# Patient Record
Sex: FEMALE | Race: WHITE | NOT HISPANIC OR LATINO | Employment: FULL TIME | URBAN - METROPOLITAN AREA
[De-identification: names, ages, dates, MRNs, and addresses within clinical notes are randomized per-mention and may not be internally consistent; named-entity substitution may affect disease eponyms.]

---

## 2023-06-07 PROBLEM — Z12.11 SCREENING FOR MALIGNANT NEOPLASM OF COLON: Status: ACTIVE | Noted: 2023-06-07

## 2023-06-07 PROBLEM — Z12.11 SCREENING FOR MALIGNANT NEOPLASM OF COLON: Status: RESOLVED | Noted: 2023-06-07 | Resolved: 2023-06-07

## 2023-06-07 PROBLEM — D49.89 NEOPLASM OF BACK: Status: ACTIVE | Noted: 2023-06-07

## 2023-06-07 PROBLEM — D22.9 MELANOCYTIC NEOPLASM OF SKIN: Status: ACTIVE | Noted: 2023-06-07

## 2023-08-30 PROBLEM — F32.A DEPRESSION: Status: ACTIVE | Noted: 2023-08-30

## 2023-08-30 PROBLEM — Z98.84 BARIATRIC SURGERY STATUS: Status: ACTIVE | Noted: 2023-08-30

## 2023-08-30 PROBLEM — E55.9 VITAMIN D DEFICIENCY: Status: ACTIVE | Noted: 2023-08-30

## 2023-08-30 PROBLEM — N93.9 ABNORMAL UTERINE BLEEDING (AUB): Status: ACTIVE | Noted: 2023-08-30

## 2023-08-30 PROBLEM — E66.01 PSYCHOLOGICAL FACTORS AFFECTING MORBID OBESITY (MULTI): Status: ACTIVE | Noted: 2023-08-30

## 2023-08-30 PROBLEM — F54 PSYCHOLOGICAL FACTORS AFFECTING MORBID OBESITY (MULTI): Status: ACTIVE | Noted: 2023-08-30

## 2023-08-30 PROBLEM — Z98.84 S/P LAPAROSCOPIC SLEEVE GASTRECTOMY: Status: ACTIVE | Noted: 2023-08-30

## 2023-08-30 PROBLEM — G47.33 OBSTRUCTIVE SLEEP APNEA SYNDROME IN ADULT: Status: ACTIVE | Noted: 2023-08-30

## 2023-08-30 PROBLEM — E78.5 HLD (HYPERLIPIDEMIA): Status: ACTIVE | Noted: 2023-08-30

## 2023-08-30 RX ORDER — CALCIUM CARBONATE 500(1250)
TABLET ORAL
COMMUNITY
Start: 2022-02-25

## 2023-08-30 RX ORDER — MULTIVIT WITH IRON,MINERALS
1 TABLET,CHEWABLE ORAL DAILY
COMMUNITY
End: 2024-05-06 | Stop reason: WASHOUT

## 2023-08-30 RX ORDER — BUPROPION HYDROCHLORIDE 200 MG/1
1 TABLET, EXTENDED RELEASE ORAL DAILY
COMMUNITY
Start: 2020-05-06 | End: 2024-03-01

## 2023-08-30 RX ORDER — LANOLIN ALCOHOL/MO/W.PET/CERES
CREAM (GRAM) TOPICAL
COMMUNITY
Start: 2022-02-25

## 2023-08-30 RX ORDER — UBIDECARENONE 30 MG
CAPSULE ORAL
COMMUNITY
Start: 2022-02-25

## 2023-08-30 RX ORDER — CHOLECALCIFEROL (VITAMIN D3) 125 MCG
1 CAPSULE ORAL DAILY
COMMUNITY

## 2023-09-27 LAB
ALANINE AMINOTRANSFERASE (SGPT) (U/L) IN SER/PLAS: 7 U/L (ref 7–45)
ALBUMIN (G/DL) IN SER/PLAS: 4.2 G/DL (ref 3.4–5)
ALKALINE PHOSPHATASE (U/L) IN SER/PLAS: 60 U/L (ref 33–110)
ANION GAP IN SER/PLAS: 13 MMOL/L (ref 10–20)
ASPARTATE AMINOTRANSFERASE (SGOT) (U/L) IN SER/PLAS: 14 U/L (ref 9–39)
BASOPHILS (10*3/UL) IN BLOOD BY AUTOMATED COUNT: 0.05 X10E9/L (ref 0–0.1)
BASOPHILS/100 LEUKOCYTES IN BLOOD BY AUTOMATED COUNT: 0.8 % (ref 0–2)
BILIRUBIN TOTAL (MG/DL) IN SER/PLAS: 0.6 MG/DL (ref 0–1.2)
CALCIDIOL (25 OH VITAMIN D3) (NG/ML) IN SER/PLAS: 38 NG/ML
CALCIUM (MG/DL) IN SER/PLAS: 9.2 MG/DL (ref 8.6–10.6)
CARBON DIOXIDE, TOTAL (MMOL/L) IN SER/PLAS: 25 MMOL/L (ref 21–32)
CHLORIDE (MMOL/L) IN SER/PLAS: 107 MMOL/L (ref 98–107)
COBALAMIN (VITAMIN B12) (PG/ML) IN SER/PLAS: 484 PG/ML (ref 211–911)
CREATININE (MG/DL) IN SER/PLAS: 0.87 MG/DL (ref 0.5–1.05)
EOSINOPHILS (10*3/UL) IN BLOOD BY AUTOMATED COUNT: 0.08 X10E9/L (ref 0–0.7)
EOSINOPHILS/100 LEUKOCYTES IN BLOOD BY AUTOMATED COUNT: 1.2 % (ref 0–6)
ERYTHROCYTE DISTRIBUTION WIDTH (RATIO) BY AUTOMATED COUNT: 12.9 % (ref 11.5–14.5)
ERYTHROCYTE MEAN CORPUSCULAR HEMOGLOBIN CONCENTRATION (G/DL) BY AUTOMATED: 33.5 G/DL (ref 32–36)
ERYTHROCYTE MEAN CORPUSCULAR VOLUME (FL) BY AUTOMATED COUNT: 90 FL (ref 80–100)
ERYTHROCYTES (10*6/UL) IN BLOOD BY AUTOMATED COUNT: 4.63 X10E12/L (ref 4–5.2)
FERRITIN (UG/LL) IN SER/PLAS: 34 UG/L (ref 8–150)
FOLATE (NG/ML) IN SER/PLAS: >24 NG/ML
GFR FEMALE: 89 ML/MIN/1.73M2
GLUCOSE (MG/DL) IN SER/PLAS: 60 MG/DL (ref 74–99)
HEMATOCRIT (%) IN BLOOD BY AUTOMATED COUNT: 41.8 % (ref 36–46)
HEMOGLOBIN (G/DL) IN BLOOD: 14 G/DL (ref 12–16)
IMMATURE GRANULOCYTES/100 LEUKOCYTES IN BLOOD BY AUTOMATED COUNT: 0.2 % (ref 0–0.9)
IRON (UG/DL) IN SER/PLAS: 133 UG/DL (ref 35–150)
IRON BINDING CAPACITY (UG/DL) IN SER/PLAS: 384 UG/DL (ref 240–445)
IRON SATURATION (%) IN SER/PLAS: 35 % (ref 25–45)
LEUKOCYTES (10*3/UL) IN BLOOD BY AUTOMATED COUNT: 6.6 X10E9/L (ref 4.4–11.3)
LYMPHOCYTES (10*3/UL) IN BLOOD BY AUTOMATED COUNT: 2.17 X10E9/L (ref 1.2–4.8)
LYMPHOCYTES/100 LEUKOCYTES IN BLOOD BY AUTOMATED COUNT: 32.9 % (ref 13–44)
MONOCYTES (10*3/UL) IN BLOOD BY AUTOMATED COUNT: 0.67 X10E9/L (ref 0.1–1)
MONOCYTES/100 LEUKOCYTES IN BLOOD BY AUTOMATED COUNT: 10.2 % (ref 2–10)
NEUTROPHILS (10*3/UL) IN BLOOD BY AUTOMATED COUNT: 3.61 X10E9/L (ref 1.2–7.7)
NEUTROPHILS/100 LEUKOCYTES IN BLOOD BY AUTOMATED COUNT: 54.7 % (ref 40–80)
NRBC (PER 100 WBCS) BY AUTOMATED COUNT: 0 /100 WBC (ref 0–0)
PARATHYRIN INTACT (PG/ML) IN SER/PLAS: 22.3 PG/ML (ref 18.5–88)
PLATELETS (10*3/UL) IN BLOOD AUTOMATED COUNT: 262 X10E9/L (ref 150–450)
POTASSIUM (MMOL/L) IN SER/PLAS: 4.4 MMOL/L (ref 3.5–5.3)
PROTEIN TOTAL: 6.5 G/DL (ref 6.4–8.2)
SODIUM (MMOL/L) IN SER/PLAS: 141 MMOL/L (ref 136–145)
THYROTROPIN (MIU/L) IN SER/PLAS BY DETECTION LIMIT <= 0.05 MIU/L: 1.15 MIU/L (ref 0.44–3.98)
UREA NITROGEN (MG/DL) IN SER/PLAS: 12 MG/DL (ref 6–23)

## 2023-09-29 ENCOUNTER — HOSPITAL ENCOUNTER (OUTPATIENT)
Dept: DATA CONVERSION | Facility: HOSPITAL | Age: 33
End: 2023-09-29
Attending: STUDENT IN AN ORGANIZED HEALTH CARE EDUCATION/TRAINING PROGRAM | Admitting: STUDENT IN AN ORGANIZED HEALTH CARE EDUCATION/TRAINING PROGRAM
Payer: COMMERCIAL

## 2023-09-29 DIAGNOSIS — N93.9 ABNORMAL UTERINE AND VAGINAL BLEEDING, UNSPECIFIED: ICD-10-CM

## 2023-09-29 LAB
ABO GROUP (TYPE) IN BLOOD: NORMAL
ANTIBODY SCREEN: NORMAL
RH FACTOR: NORMAL

## 2023-09-30 LAB — VITAMIN B6: 46.9 NMOL/L (ref 20–125)

## 2023-09-30 NOTE — H&P
History of Present Illness:   Pregnant/Lactating:  ·  Are You Pregnant no   ·  Are You Currently Breastfeeding no     History Present Illness:  Reason for surgery: Abnormal Uterine Bleeding   HPI:    32 yo presenting for exam under anesthesia, D&C, and hysteroscopy for AUB (likely AUB-P)    Pre-op labs (9/227/23): hb 14.0, plt 262, Cr 0.87    OBHx: G0  GynHx: last pap 2022 NILM HPV neg  PMH: depression, HLD, MUKESH, BMI 26, vit D deficiency  PSH: laparoscopic sleeve gastrectomy, wisdom tooth extraction  Med: bupropion, multivitamin, vit B12, calcium  All: NKDA  Soc: never smoker, occasional etoh, no recreational drugs  Fhx: ovarian CA (paternal gma), DM, MUKESH, asthma, HLD    TVUS March 2023  ultrasound of the pelvis.     FINDINGS:  UTERUS:  The uterus is normal size, measuring 7.6 x 3.1 x 4.8 cm. There are no  leiomyomas.     ENDOMETRIUM:  Normal thickness, measuring 7 mm. Suspected lobular hyperechoic mass  centered in the endometrium measures 5 mm at the level of the  body-fundus junction (image 67 of 89).     RIGHT ADNEXA:  Normal size, measuring 2.7 x 2.6 x 2.4 cm. Normal arterial and venous  spectral Doppler waveforms.     LEFT ADNEXA:  Normal size, measuring 2.5 x 1.4 x 2.0 cm. Normal arterial and venous  spectral Doppler waveforms.     CUL DE SAC:  No pelvic free fluid.     IMPRESSION:  Suspected endometrial polyp measures 5 mm.        Allergies:        Allergies:  ·  No Known Allergies :     Home Medication Review:   Home Medications Reviewed: yes     Impression/Procedure:   ·  Impression and Planned Procedure: aub, exam under anesthesia, Dilation & Curettage, hysteroscopy, and possible removal of uterine polyp       ERAS (Enhanced Recovery After Surgery):  ·  ERAS Patient: yes   ·  CPM/PAT Utilization: no   ·  Immunonutrition Recovery Drink Utilization: no   ·  Carbohydrate Supplement Drink Utilization: no     Review of Systems:   Review of Systems:  All Other Systems: All other systems reviewed and  are  negative       Vital Signs:  Temperature C: 36.7 degrees C   Temperature F: 98 degrees F   Heart Rate: 79 beats per minute   Respiratory Rate: 16 breath per minute   Blood Pressure Systolic: 107 mm/Hg   Blood Pressure Diastolic: 77 mm/Hg     Physical Exam by System:    Constitutional: Lying in bed in NAD   Eyes: sclera anicteric   ENMT: moist mucous membranes   Head/Neck: NCAT   Respiratory/Thorax: Normal work of breathing on room  air   Cardiovascular: Warm and well-perfused   Genitourinary: deferred to OR   Musculoskeletal: Full ROM   Neurological: No gross neurologic deficits   Psychological: Mood and affect appropriate   Skin: Warm and dry, no lesions, no rashes     Consent:   COVID-19 Consent:  ·  COVID-19 Risk Consent Surgeon has reviewed key risks related to the risk of tanner COVID-19 and if they contract COVID-19 what the risks are.     Attestation:   Note Completion:  I am a:  Resident/Fellow   Attending Attestation I saw and evaluated the patient.  I personally obtained the key and critical portions of the history and physical exam or was physically present for key and  critical portions performed by the resident/fellow. I reviewed the resident/fellow?s documentation and discussed the patient with the resident/fellow.  I agree with the resident/fellow?s medical decision making as documented in the note.     I personally evaluated the patient on 29-Sep-2023         Electronic Signatures:  Mary Richter (Resident))  (Signed 29-Sep-2023 07:28)   Authored: History of Present Illness, Allergies, Home  Medication Review, Impression/Procedure, ERAS, Physical Exam, Consent, Note Completion  Amira Farrar)  (Signed 29-Sep-2023 13:11)   Authored: Impression/Procedure, Review of Systems, Physical  Exam, Note Completion   Co-Signer: History of Present Illness, Allergies, Home Medication Review, Impression/Procedure, ERAS, Physical Exam, Consent, Note Completion      Last Updated: 29-Sep-2023 13:11 by  Amira Farrar)

## 2023-10-01 NOTE — OP NOTE
Post Operative Note:     PreOp Diagnosis: Abnormal uterine bleeding   Post-Procedure Diagnosis: Same   Procedure: 1. Exam under anesthesia  2. Hysteroscopy with endometrial sampling   Surgeon: STACY Farrar   Resident/Fellow/Other Assistant: EMELY Richter   Anesthesia: General   I.V. Fluids: 400mL Lactated Ringers   Estimated Blood Loss (mL): 5   Blood Replacement: none   Specimen: yes. Endometrial curettes   Complications: None apparent   Findings: see operative report   Patient Returned To/Condition: PACU in satisfactory  cnodition   Additional Details: uterine distension medium normal  saline with fluid deficit < 200 mL     Operative Report Dictated:  Dictation: not applicable - note contains Operative  Report   Operative Report:    Indication: Abnormal uterine bleeding    Findings: Small, anteverted uterus with closed cervix. No adnexal masses palpated. Normal endometrial cavity globally thick appearing endometrial tissue, with no focal lesions.    The patient was taken to the operating room where general anesthesia was administered. She was then positioned in the dorsal lithotomy position and a bimanual exam was performed with findings as above. The patient was then prepped and draped in the normal  sterile fashion. Next, two right angle retractors were placed in the vagina to visualize the cervix and a single tooth tenaculum was placed on the anterior lip of the cervix for traction. A paracervical block of 20mL of lidocaine was administered at the  4 and 8 o'clock positions. The cervix was dilated to 15 Hungarian.  An attempt was made to pass the 5.7 mm hysteroscope, however difficulty was encountered accessing the uterine cavity.  On hysteroscopic assessment, it was noted that the hysteroscope had  inadvertently scraped along the posterior wall of the cervical canal, resulting in a small area of endocervical mucosa shearing away from the cervical stroma and curling cephalad along itself, partially obstructing the  cervical canal.  The 5.7 mm hysteroscope  could not be advanced farther along the canal beyond this obstruction, despite easy passage of up to a 19 Vietnamese dilator through the internal os.  The 5.7 mm scope was therefore exchanged for the 4.6 mm scope, and this scope was able to be passed into  the endometrial cavity without difficulty, with the bilateral tubal ostia visualized without difficulty.  No focal pathology was appreciated within the cavity. The size small resector was used to globally sample the endometrium throughout the cavity,  as well as to resect the small area of sheared tissue from the cervical canal near the endocervix so that the canal was not obstructed. The hysteroscope was removed. The tenaculum site was found to be hemostatic with minimal blood-tinged saline noted  to efflux from the cervical os.     All counts were correct.  The specimen was sent to pathology.  Dr. Farrar was present for the entire procedure. The patient was taken from the operating room in stable condition after reversal of anesthesia. She was return to PACU in stable condition.            Attestation:   Note Completion:  I am a: Resident/Fellow   Attending Attestation I was present for the entire procedure          Electronic Signatures:  Mary Richter (Resident))  (Signed 29-Sep-2023 13:16)   Authored: Post Operative Note, Note Completion  Amira Farrar)  (Signed 29-Sep-2023 16:00)   Authored: Post Operative Note, Note Completion   Co-Signer: Post Operative Note, Note Completion      Last Updated: 29-Sep-2023 16:00 by Amira Farrar)

## 2023-10-02 LAB — VITAMIN B1, WHOLE BLOOD: 177 NMOL/L (ref 70–180)

## 2023-10-04 ASSESSMENT — ENCOUNTER SYMPTOMS
ABDOMINAL PAIN: 0
DYSURIA: 0
CONSTIPATION: 0
CHILLS: 0
FEVER: 0
NAUSEA: 0
HEADACHES: 0
FREQUENCY: 0
BACK PAIN: 0
SORE THROAT: 0
FLANK PAIN: 0
DIARRHEA: 0
ANOREXIA: 0
VOMITING: 0
HEMATURIA: 0

## 2023-10-05 LAB
COMPLETE PATHOLOGY REPORT: NORMAL
CONVERTED CLINICAL DIAGNOSIS-HISTORY: NORMAL
CONVERTED FINAL DIAGNOSIS: NORMAL
CONVERTED FINAL REPORT PDF LINK TO COPY AND PASTE: NORMAL
CONVERTED GROSS DESCRIPTION: NORMAL

## 2023-10-06 ENCOUNTER — OFFICE VISIT (OUTPATIENT)
Dept: OBSTETRICS AND GYNECOLOGY | Facility: CLINIC | Age: 33
End: 2023-10-06
Payer: COMMERCIAL

## 2023-10-06 VITALS
BODY MASS INDEX: 26.29 KG/M2 | WEIGHT: 154 LBS | SYSTOLIC BLOOD PRESSURE: 120 MMHG | DIASTOLIC BLOOD PRESSURE: 82 MMHG | HEIGHT: 64 IN

## 2023-10-06 DIAGNOSIS — Z48.89 POSTOPERATIVE OBSERVATION: Primary | ICD-10-CM

## 2023-10-06 PROCEDURE — 1036F TOBACCO NON-USER: CPT | Performed by: STUDENT IN AN ORGANIZED HEALTH CARE EDUCATION/TRAINING PROGRAM

## 2023-10-06 PROCEDURE — 99024 POSTOP FOLLOW-UP VISIT: CPT | Performed by: STUDENT IN AN ORGANIZED HEALTH CARE EDUCATION/TRAINING PROGRAM

## 2023-10-06 ASSESSMENT — PAIN SCALES - GENERAL: PAINLEVEL: 0-NO PAIN

## 2023-10-06 NOTE — PROGRESS NOTES
Answers submitted by the patient for this visit:  Female Genital Questionnaire (Submitted on 10/4/2023)  Chief Complaint: Female genitourinary complaint  genital itching: No  genital lesions: No  genital odor: No  genital rash: No  missed menses: No  pelvic pain: No  vaginal bleeding: Yes  vaginal discharge: Yes  Chronicity: recurrent  Onset: more than 1 year ago  Frequency: every several days  Progression since onset: unchanged  Severity of pain: mild  Affected side: left  Pregnant now?: No  abdominal pain: No  anorexia: No  back pain: No  chills: No  constipation: No  diarrhea: No  discolored urine: No  dysuria: No  fever: No  flank pain: No  frequency: No  headaches: No  hematuria: No  joint pain: No  joint swelling: No  nausea: No  painful intercourse: No  rash: No  sore throat: No  urgency: No  vomiting: No  Aggravated by: nothing  Sexual activity: sexually active  Partner with STD symptoms: no  Birth control: condoms  Menstrual history: irregular  Vaginal bleeding: heavier than menses  Passing clots?: Yes  Passing tissue?: No  Discharge characteristics: normal, brown, clear, mucopurulent, scant, thick

## 2023-10-09 ASSESSMENT — ENCOUNTER SYMPTOMS
DYSURIA: 0
FREQUENCY: 0
HEMATURIA: 0
SORE THROAT: 0
FLANK PAIN: 0
BACK PAIN: 0
HEADACHES: 0
ANOREXIA: 0
ABDOMINAL PAIN: 0
VOMITING: 0
CHILLS: 0
DIARRHEA: 0
CONSTIPATION: 0
FEVER: 0
NAUSEA: 0

## 2023-10-09 NOTE — PROGRESS NOTES
Subjective   Patient ID: Mirela Hardy is a 33 y.o. female who presents for Post-op.  Presents for postoperative visit.  Feeling well without complaints.  Bleeding scant.  No abnormal discharge.  Denies pain, fevers, chills.    Female  Problem  The patient's primary symptoms include vaginal bleeding and vaginal discharge. The patient's pertinent negatives include no genital itching, genital lesions, genital odor, genital rash, missed menses or pelvic pain. This is a recurrent problem. The current episode started more than 1 year ago. The problem occurs every several days. The problem has been unchanged. The pain is mild. The problem affects the left side. She is not pregnant. Pertinent negatives include no abdominal pain, anorexia, back pain, chills, constipation, diarrhea, discolored urine, dysuria, fever, flank pain, frequency, headaches, hematuria, joint pain, joint swelling, nausea, painful intercourse, rash, sore throat, urgency or vomiting. The vaginal discharge was normal, brown, clear, mucopurulent, scant and thick. The vaginal bleeding is heavier than menses. She has been passing clots. She has not been passing tissue. Nothing aggravates the symptoms. She is sexually active. No, her partner does not have an STD. She uses condoms for contraception. Her menstrual history has been irregular.       Review of Systems   Constitutional:  Negative for chills and fever.   HENT:  Negative for sore throat.    Gastrointestinal:  Negative for abdominal pain, anorexia, constipation, diarrhea, nausea and vomiting.   Genitourinary:  Positive for vaginal discharge. Negative for dysuria, flank pain, frequency, hematuria, missed menses, pelvic pain and urgency.   Musculoskeletal:  Negative for back pain and joint pain.   Skin:  Negative for rash.   Neurological:  Negative for headaches.   All other systems reviewed and are negative.      Objective   Physical Exam  Constitutional:       Appearance: Normal appearance.   HENT:       Head: Normocephalic and atraumatic.      Nose: Nose normal.      Mouth/Throat:      Mouth: Mucous membranes are moist.      Pharynx: No oropharyngeal exudate or posterior oropharyngeal erythema.   Eyes:      Extraocular Movements: Extraocular movements intact.      Conjunctiva/sclera: Conjunctivae normal.   Pulmonary:      Effort: Pulmonary effort is normal.   Musculoskeletal:      Cervical back: Normal range of motion.   Skin:     Findings: No bruising, erythema, lesion or rash.   Neurological:      General: No focal deficit present.      Mental Status: She is alert.   Psychiatric:         Mood and Affect: Mood normal.         Behavior: Behavior normal.         Thought Content: Thought content normal.         Judgment: Judgment normal.         Assessment/Plan   Reviewed intraoperative findings from her procedure of overall normal appearing endometrial cavity with no focal pathology identified other than small area on posterior wall of uterus where tissue was more firm than surrounding, c/w possible fibroid, and c/w possible fibroid tissue identified, and reviewed as well histology otherwise demonstrating normal endometrial tissue.  Discussed no intraoperative or microscopic findings identified to preclude attempt at pregnancy now which she and her partner have been planning.  We discussed return for fertility assessment if not pregnant within 6 months given age, and to initiate prenatal vitamin.    Amira Farrar MD

## 2024-01-23 NOTE — PROGRESS NOTES
[facility]  FOLLOW UP NOTE      Name: Mirela Hardy  MRN: [id number]    Virtual or Telephone Consent    {Complete for a virtual or telephone visit:74402}  {Select who provided consent:16593}    Index Surgery  Date of Surgery: ***   Surgeon: ***    Surgical Procedure: {bariatric procedure list:38007}    HPI:   Presenting for follow up visit.    Diet ***    Exercise ***    Concerns related to:  Nausea/Vomiting, Reflux: ***  Abdominal Pain: ***  Diarrhea/Constipation ***    DAILY SUPPLEMENTS:  Calcium: Calcium Citrate w/ vitamin D (1200 - 1500mg)  Multivitamin & Minerals: 2 per day  Vitamin B12: 500 mcg/day   Vitamin D3: 3000 units  Iron/Other: ***  PPI:***      Current Outpatient Medications   Medication Sig Dispense Refill    biotin 5,000 mcg tablet,disintegrating Take 1 tablet by mouth early in the morning..      buPROPion SR (Wellbutrin SR) 200 mg 12 hr tablet Take 1 tablet (200 mg) by mouth once daily.      calcium carbonate (Oscal) 500 mg calcium (1,250 mg) tablet Take by mouth.      cyanocobalamin (Vitamin B-12) 1,000 mcg tablet Take by mouth.      multivitamin-children's (Flintstones Complete, iron,) chewable tablet Chew 1 tablet once daily.      mv-calcium-min-iron fm-FA-vitK (Multi For Her) 18 mg iron-600 mcg-80 mcg tablet Take by mouth.       No current facility-administered medications for this visit.       Comorbidities:  Patient Active Problem List   Diagnosis    Neoplasm of back    Melanocytic neoplasm of skin    Abnormal uterine bleeding (AUB)    Bariatric surgery status    Depression    HLD (hyperlipidemia)    Obstructive sleep apnea syndrome in adult    Psychological factors affecting morbid obesity (CMS/HCC)    S/P laparoscopic sleeve gastrectomy    Vitamin D deficiency         REVIEW OF SYSTEMS:  CONSTITUTIONAL: Patient denies fevers, chills, sweats and weight changes.  CARDIOVASCULAR: Patient denies chest pains, palpitations, orthopnea and paroxysmal nocturnal dyspnea.  RESPIRATORY: No  dyspnea on exertion, no wheezing or cough.  GI: No nausea, vomiting, diarrhea, constipation, abdominal pain, hematochezia or melena.  MUSCULOSKELETAL: No myalgias or arthralgias.  NEUROLOGIC: No chronic headaches, no seizures. Patient denies numbness, tingling or weakness.  PSYCHIATRIC: Patient denies problems with mood disturbance. No problems with anxiety.  ENDOCRINE: No excessive urination or excessive thirst.  DERMATOLOGIC: Patient denies any rashes or skin changes.    PHYSICAL EXAM:  There were no vitals taken for this visit.  Alert, well appearing, no acute distress, nourished, hydrated.  Anicteric sclera, no ptosis  Facial symmetry  Neck supple  Unlabored respirations.  Easily conversant without increased respiratory effort  Oriented to person, place, time.  Judgement intact.  Appropriate mood, affect.       ASSESSMENT & PLAN:  33 y.o. female presenting for follow up visit s/p bariatric surgery.    Current Weight:     Wt Readings from Last 1 Encounters:   10/06/23 69.9 kg (154 lb)       {Assess/Plan SmartLinks (Optional):75439}    I personally spent >50% of total minutes face to face with the patient in counseling and discussion and/or coordination of care as described above.

## 2024-01-29 ENCOUNTER — APPOINTMENT (OUTPATIENT)
Dept: SURGERY | Facility: CLINIC | Age: 34
End: 2024-01-29
Payer: COMMERCIAL

## 2024-03-01 DIAGNOSIS — F32.A DEPRESSION, UNSPECIFIED: ICD-10-CM

## 2024-03-01 RX ORDER — BUPROPION HYDROCHLORIDE 200 MG/1
200 TABLET, EXTENDED RELEASE ORAL DAILY
Qty: 90 TABLET | Refills: 3 | Status: SHIPPED | OUTPATIENT
Start: 2024-03-01

## 2024-03-15 ENCOUNTER — OFFICE VISIT (OUTPATIENT)
Dept: OBSTETRICS AND GYNECOLOGY | Facility: CLINIC | Age: 34
End: 2024-03-15
Payer: COMMERCIAL

## 2024-03-15 VITALS
SYSTOLIC BLOOD PRESSURE: 104 MMHG | BODY MASS INDEX: 27.14 KG/M2 | DIASTOLIC BLOOD PRESSURE: 60 MMHG | HEIGHT: 64 IN | WEIGHT: 159 LBS

## 2024-03-15 DIAGNOSIS — M62.89 HIGH-TONE PELVIC FLOOR DYSFUNCTION: ICD-10-CM

## 2024-03-15 DIAGNOSIS — N93.9 ABNORMAL UTERINE BLEEDING (AUB): Primary | ICD-10-CM

## 2024-03-15 DIAGNOSIS — N76.1 CHRONIC VAGINITIS: ICD-10-CM

## 2024-03-15 LAB — PREGNANCY TEST URINE, POC: NEGATIVE

## 2024-03-15 PROCEDURE — 81025 URINE PREGNANCY TEST: CPT | Performed by: STUDENT IN AN ORGANIZED HEALTH CARE EDUCATION/TRAINING PROGRAM

## 2024-03-15 PROCEDURE — 1036F TOBACCO NON-USER: CPT | Performed by: STUDENT IN AN ORGANIZED HEALTH CARE EDUCATION/TRAINING PROGRAM

## 2024-03-15 PROCEDURE — 99214 OFFICE O/P EST MOD 30 MIN: CPT | Performed by: STUDENT IN AN ORGANIZED HEALTH CARE EDUCATION/TRAINING PROGRAM

## 2024-03-15 PROCEDURE — 87205 SMEAR GRAM STAIN: CPT

## 2024-03-15 ASSESSMENT — PAIN SCALES - GENERAL: PAINLEVEL: 0-NO PAIN

## 2024-03-15 NOTE — PROGRESS NOTES
Subjective   Patient ID: Mirela Hardy is a 34 y.o. female who presents for Vaginal Bleeding (Have a menses every 7-10 days ).  Menses have become irregular again and now having bleeding every 7-10 days.  Increased vaginal odor and feels general bodily odor is also increased.  Has also noticed pain with sex or tampon use, both with insertion and deep penetration.  Menses are not associated with any increased pain than previously.    Bloating prior to and during menses.        Review of Systems   All other systems reviewed and are negative.      Objective   Physical Exam  Constitutional:       Appearance: Normal appearance.   HENT:      Head: Normocephalic and atraumatic.      Nose: Nose normal.      Mouth/Throat:      Mouth: Mucous membranes are moist.      Pharynx: No oropharyngeal exudate or posterior oropharyngeal erythema.   Eyes:      Extraocular Movements: Extraocular movements intact.      Conjunctiva/sclera: Conjunctivae normal.   Pulmonary:      Effort: Pulmonary effort is normal.   Genitourinary:     General: Normal vulva.      Vagina: Normal.      Cervix: Normal.      Uterus: Normal.       Adnexa: Right adnexa normal and left adnexa normal.      Comments: Moderate increase in pelvic floor tone with tenderness to palpation noted  Musculoskeletal:      Cervical back: Normal range of motion.   Skin:     Findings: No bruising, erythema, lesion or rash.   Neurological:      General: No focal deficit present.      Mental Status: She is alert.   Psychiatric:         Mood and Affect: Mood normal.         Behavior: Behavior normal.         Thought Content: Thought content normal.         Judgment: Judgment normal.         Assessment/Plan   Reviewed symptoms may be interrelated or due to separate etiologies.  Discussed PCOS as potential contributor to menstrual irregularity given irregular menses as well as difficulty conceiving.  She is scheduled to see NADINE in May.   Discussed initiating assessment with laboratory  evaluation, with ultrasound to follow depending on lab results.    Reviewed exam findings and that pelvic floor dysfunction may be a factor in pain with penetration. Discussed natural history of this diagnosis and recommended therapy of PT.  She is amenable to referral.     Vaginitis panel collected to assess vaginal discharge/odor.  Treat based on results.    Will review results and contact patient - follow up depending on results of above.      Amira Farrar MD 03/15/24 1:38 PM

## 2024-03-16 LAB
CLUE CELLS VAG LPF-#/AREA: NORMAL /[LPF]
NUGENT SCORE: 0
YEAST VAG WET PREP-#/AREA: NORMAL

## 2024-03-22 ENCOUNTER — LAB (OUTPATIENT)
Dept: LAB | Facility: LAB | Age: 34
End: 2024-03-22
Payer: COMMERCIAL

## 2024-03-22 DIAGNOSIS — N93.9 ABNORMAL UTERINE BLEEDING (AUB): ICD-10-CM

## 2024-03-22 LAB
DHEA-S SERPL-MCNC: 203 UG/DL (ref 12–379)
EST. AVERAGE GLUCOSE BLD GHB EST-MCNC: 97 MG/DL
ESTRADIOL SERPL-MCNC: 146 PG/ML
FSH SERPL-ACNC: 1.4 IU/L
HBA1C MFR BLD: 5 %
PROLACTIN SERPL-MCNC: 3.4 UG/L (ref 3–20)
TSH SERPL-ACNC: 0.91 MIU/L (ref 0.44–3.98)

## 2024-03-22 PROCEDURE — 82627 DEHYDROEPIANDROSTERONE: CPT

## 2024-03-22 PROCEDURE — 84146 ASSAY OF PROLACTIN: CPT

## 2024-03-22 PROCEDURE — 83498 ASY HYDROXYPROGESTERONE 17-D: CPT

## 2024-03-22 PROCEDURE — 83036 HEMOGLOBIN GLYCOSYLATED A1C: CPT

## 2024-03-22 PROCEDURE — 84443 ASSAY THYROID STIM HORMONE: CPT

## 2024-03-22 PROCEDURE — 36415 COLL VENOUS BLD VENIPUNCTURE: CPT

## 2024-03-22 PROCEDURE — 84402 ASSAY OF FREE TESTOSTERONE: CPT

## 2024-03-22 PROCEDURE — 83001 ASSAY OF GONADOTROPIN (FSH): CPT

## 2024-03-22 PROCEDURE — 82670 ASSAY OF TOTAL ESTRADIOL: CPT

## 2024-03-27 LAB — 17OHP SERPL-MCNC: 127.74 NG/DL

## 2024-03-28 LAB
TESTOSTERONE FREE (CHAN): 1.6 PG/ML (ref 0.1–6.4)
TESTOSTERONE,TOTAL,LC-MS/MS: 27 NG/DL (ref 2–45)

## 2024-04-05 ENCOUNTER — APPOINTMENT (OUTPATIENT)
Dept: OBSTETRICS AND GYNECOLOGY | Facility: CLINIC | Age: 34
End: 2024-04-05
Payer: COMMERCIAL

## 2024-05-04 ASSESSMENT — LIFESTYLE VARIABLES
TOBACCO_USE: NO
HISTORY_ALCOHOL_USE: YES

## 2024-05-06 ENCOUNTER — CONSULT (OUTPATIENT)
Dept: ENDOCRINOLOGY | Facility: CLINIC | Age: 34
End: 2024-05-06
Payer: COMMERCIAL

## 2024-05-06 VITALS
BODY MASS INDEX: 26.63 KG/M2 | HEIGHT: 64 IN | SYSTOLIC BLOOD PRESSURE: 108 MMHG | WEIGHT: 156 LBS | HEART RATE: 74 BPM | DIASTOLIC BLOOD PRESSURE: 70 MMHG

## 2024-05-06 DIAGNOSIS — Z01.83 ENCOUNTER FOR RH BLOOD TYPING: ICD-10-CM

## 2024-05-06 DIAGNOSIS — Z13.71 SCREENING FOR GENETIC DISEASE CARRIER STATUS: ICD-10-CM

## 2024-05-06 DIAGNOSIS — Z01.812 ENCOUNTER FOR PREPROCEDURAL LABORATORY EXAMINATION: ICD-10-CM

## 2024-05-06 DIAGNOSIS — Z11.3 SCREENING FOR STDS (SEXUALLY TRANSMITTED DISEASES): ICD-10-CM

## 2024-05-06 DIAGNOSIS — N92.6 IRREGULAR MENSTRUAL CYCLE: ICD-10-CM

## 2024-05-06 DIAGNOSIS — Z13.29 SCREENING FOR THYROID DISORDER: Primary | ICD-10-CM

## 2024-05-06 DIAGNOSIS — Z31.41 FERTILITY TESTING: ICD-10-CM

## 2024-05-06 DIAGNOSIS — Z11.59 ENCOUNTER FOR SCREENING FOR OTHER VIRAL DISEASES: ICD-10-CM

## 2024-05-06 DIAGNOSIS — Z31.69 ENCOUNTER FOR PRECONCEPTION CONSULTATION: ICD-10-CM

## 2024-05-06 PROCEDURE — 99214 OFFICE O/P EST MOD 30 MIN: CPT | Performed by: NURSE PRACTITIONER

## 2024-05-06 ASSESSMENT — ENCOUNTER SYMPTOMS
LOSS OF SENSATION IN FEET: 0
OCCASIONAL FEELINGS OF UNSTEADINESS: 0
DEPRESSION: 0

## 2024-05-06 ASSESSMENT — COLUMBIA-SUICIDE SEVERITY RATING SCALE - C-SSRS
1. IN THE PAST MONTH, HAVE YOU WISHED YOU WERE DEAD OR WISHED YOU COULD GO TO SLEEP AND NOT WAKE UP?: NO
6. HAVE YOU EVER DONE ANYTHING, STARTED TO DO ANYTHING, OR PREPARED TO DO ANYTHING TO END YOUR LIFE?: NO
2. HAVE YOU ACTUALLY HAD ANY THOUGHTS OF KILLING YOURSELF?: NO

## 2024-05-06 ASSESSMENT — PATIENT HEALTH QUESTIONNAIRE - PHQ9
SUM OF ALL RESPONSES TO PHQ9 QUESTIONS 1 AND 2: 0
1. LITTLE INTEREST OR PLEASURE IN DOING THINGS: NOT AT ALL
2. FEELING DOWN, DEPRESSED OR HOPELESS: NOT AT ALL

## 2024-05-06 ASSESSMENT — PAIN SCALES - GENERAL: PAINLEVEL: 0-NO PAIN

## 2024-05-06 NOTE — PROGRESS NOTES
Visit Type: In Person    NEW FERTILITY PATIENT VISIT    Referred by: Referred by:: Amira Farrar    Accompanied today by: Who is accompanying you to your visit:: Tee Fair is a 34 y.o.  female who presents with Please tell us your reason for this visit today: Infertility  TTC x 8 months     Have you had any concerns about your fertility treatments so far?     What are you goals for today's visit?     What causes of infertility have been identified on your workup so far?     Past Infertility Treatments: Have you undergone any treatments for fertility: No      Please summarize your fertility treatments to date.       PRIOR EVALUATION / TREATMENT  Hysteroscopy with D&C with Dr. Farrar 2023 for Uterine Polyp 5 mm  Hysterosalpingogram: None, N/A  Saline Infused Sonography: None, N/A  GYN Pelvic Ultrasound:  3/2023  UTERUS:  The uterus is normal size, measuring 7.6 x 3.1 x 4.8 cm. There are no  leiomyomas.     ENDOMETRIUM:  Normal thickness, measuring 7 mm. Suspected lobular hyperechoic mass  centered in the endometrium measures 5 mm at the level of the  body-fundus junction (image 67 of 89).     RIGHT ADNEXA:  Normal size, measuring 2.7 x 2.6 x 2.4 cm. Normal arterial and venous  spectral Doppler waveforms.     LEFT ADNEXA:  Normal size, measuring 2.5 x 1.4 x 2.0 cm. Normal arterial and venous  spectral Doppler waveforms.     CUL DE SAC:  No pelvic free fluid.      Impression   Suspected endometrial polyp measures 5 mm.     Prior Labs 3/2023  Component      Latest Ref Rng 3/22/2024   Hemoglobin A1C      see below % 5.0    Estimated Average Glucose      Not Established mg/dL 97    Testosterone, Free      0.1 - 6.4 pg/mL 1.6    Testosterone, Total, LC-MS/MS      2 - 45 ng/dL 27    17-Hydroxyprogesterone      <=206.00 ng/dL 127.74    DHEA Sulfate      12 - 379 ug/dL 203    Estradiol      pg/mL 146    FOLLICLE STIMULATING HORMONE      IU/L 1.4    PROLACTIN      3.0 - 20.0 ug/L 3.4    Thyroid  "Stimulating Hormone      0.44 - 3.98 mIU/L 0.91           Relationship Status:   x1 year     OB Hx  G0  OB History          0    Para   0    Term   0       0    AB   0    Living   0         SAB   0    IAB   0    Ectopic   0    Multiple   0    Live Births   0                 GYN HISTORY   Have you ever been diagnosed with a sexually transmitted disease? Have you ever been diagnosed with a sexually transmitted disease?: No    Please select all that are applicable:    Have you ever had Pelvic Inflammatory Disease? Have you ever had Pelvic Inflammatory Disease?: No    Have you had an abnormal PAP smear? Have you had an abnormal PAP smear?: No    Date & Result of last PAP smear: No results found for: \"FINALINTERP\"    Negative cytology negative HPV     Have you ever had an abnormal Mammogram? Have you ever had an abnormal mammogram?: No    Date & result of your last mammogram:  no, none, n/a, no breast concerns, no family hx of breast cancer   Do you have pelvic pain? Do you have pelvic pain?: No    How many times per week do you have intercourse?  Once per week   Do you have pain with intercourse? Do you have pain with intercourse?: Yes    Do you use lubricants with intercourse?  Yes does not recall name/brand, recommend pt only use \"fertility friendly\" lubricant   Do you have pain with bowel movements? Do you have pain with bowel movements?: No              Do you have pain with a full bladder? Do you have pain with a full bladder?: No    MENSTRUAL HISTORY  LMP: When was your last menstrual period?: Other  LMP 2024  Menarche:  10 years old   Contraception:  cOCP, stopped taking May 2023  Cycle length: Q10 days, bleeds most days of the month, VERY Irregular, longest time frame without bleeding was 2 months after D&C but since bleeding has returned   Describe your bleeding: Describe your bleeding:: Average    Dysmenorrhea: Are your menstrual periods painful?: Yes  Mild to moderate cramps " depending on day     ENDOCRINE/INFERTILITY HISTORY  Duration of infertility: If applicable, what is the approximate date you began trying to get pregnant?: 6 months    Coital Activity/week:  1-2 times per week   Nipple Discharge: Do you experience any loss of milk or liquid discharge from the breasts?: No    Vision changes: Are you experiencing any vision changes?: No    Headaches: Are you experiencing headaches?: No    Excess hair growth: Are you experiencing persistent or worsening hair growth on the face, breasts or lower abdomen?: No    Excessive hair loss: Are you experiencing loss of hair from your scalp?: No    Acne: Yes acne on face     Oily skin: Oily skin?: No    Recent weight change  Weight gain: None  Weight loss: Are you experiencing any decrease in weight?: No    Exercise more than 3 times a week: Exercise more than 3 times a week?: No    Hx of vertical sleeve gastrectomy Jan 2021, weight loss of 100 lbs     PMH  Depression  Past Medical History:   Diagnosis Date    Disorder of lipoprotein metabolism, unspecified     Borderline high cholesterol    Morbid (severe) obesity due to excess calories (Multi) 03/11/2021    Morbid obesity with BMI of 40.0-44.9, adult        MEDICATIONS  Current Outpatient Medications on File Prior to Visit   Medication Sig Dispense Refill    biotin 5,000 mcg tablet,disintegrating Take 1 tablet by mouth early in the morning..      buPROPion SR (Wellbutrin SR) 200 mg 12 hr tablet TAKE 1 TABLET DAILY. 90 tablet 3    calcium carbonate (Oscal) 500 mg calcium (1,250 mg) tablet Take by mouth.      cyanocobalamin (Vitamin B-12) 1,000 mcg tablet Take by mouth.      mv-calcium-min-iron fm-FA-vitK (Multi For Her) 18 mg iron-600 mcg-80 mcg tablet Take by mouth.      [DISCONTINUED] multivitamin-children's (Flintstones Complete, iron,) chewable tablet Chew 1 tablet once daily.       No current facility-administered medications on file prior to visit.        PSH  D&C  vertical sleeve  "gastrectomy  Past Surgical History:   Procedure Laterality Date    OTHER SURGICAL HISTORY  2020    [place] tooth extraction    OTHER SURGICAL HISTORY  2021    Bariatric surgery        PSYCH HISTORY  Have you ever been diagnosed with a mental health Issue?: Yes  Depression, well controlled on wellbutrin  Have you ever been hospitalized for a mental health disorder?: No       SOCIAL HISTORY  Social History     Tobacco Use    Smoking status: Never    Smokeless tobacco: Never   Substance Use Topics    Alcohol use: Yes    Drug use: Never   ETOH, social once per week     Occupation: Your occupation::     Have you ever been incarcerated? Have you ever been incarcerated?: No    Do you have a history of domestic violence? Do you have a history of domestic violence?: No    Do you feel safe at home? Do you feel safe at home?: Yes    Do you have a history of any negative sexual experience such as incest or rape? 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\"    Prior fertility history:  none  PMH: Partner Past Medical History:: Thyroid disease  Hypothyroidism     PSH:  [place] teeth extraction  Smoking:Partner: Recent or current tobacco use: No    Alcohol Use: Partner: Recent or current alcohol use: Yes  Social, once per week   Drug Use: Partner: Recent or current drug use: No    Medications:  synthroid  Injuries: Partner: Any history of surgeries or injuries in the reproductive area?: No    STD: Have you ever been diagnosed with a sexually transmitted disease?: No    Please select all that are applicable:    SA: Prior Semen Analysis completed?: No    SA Results:  none    FAMILY HISTORY  Family History   Problem " "Relation Name Age of Onset    Diabetes Mother      Hyperlipidemia Father      Sleep apnea Father      Asthma Brother      Ovarian cancer Paternal Grandmother      Colon cancer Paternal Grandfather         CANCER HISTORY    Breast: Breast Cancer: Please answer Yes, No or Unsure. If Yes, please, identify which family member.: Unsure    Ovarian: Ovarian Cancer: Please answer Yes, No or Unsure. If Yes, please, identify which family member.: Yes, grandma  Paternal     Colon: Colon Cancer: Please answer Yes, No or Unsure. If Yes, please, identify which family member.: Yes, grandpa  Paternal     Endometrial: Endometrial Cancer: Please answer Yes, No or Unsure. If Yes, please, identify which family member.: Unsure      FAMILY VTE HISTORY  Family History of Blood Clots: None    GENETIC HISTORY  Ethnic Background  Patient: Ethnic Background Patient:: White    Partner: Ethnic Background Partner:: White    Genetic Disease in Family  Patient: Patient: Defects and genetic syndromes? Please answer Yes, No or Unsure: No    Partner: Partner: Defects and genetic syndromes? Please answer Yes, No or Unsure: No    Birth Defects in Family  Patient: Patient: Birth defects? Please answer Yes, No or Unsure: No    Partner: Partner: Birth defects? Please answer Yes, No or Unsure: No    Genetic screening performed previously:  none     BMI:   BMI Readings from Last 1 Encounters:   24 26.78 kg/m²     VITALS:  /70 (BP Location: Right arm, Patient Position: Sitting, BP Cuff Size: Adult)   Pulse 74   Ht 1.626 m (5' 4\")   Wt 70.8 kg (156 lb)   LMP 2024 (Exact Date)   BMI 26.78 kg/m²     ASSESSMENT   34 y.o.  female with primary subfertility x 8 months, suspected oligoovulation and the following pertinent medical issues: Irregular menstrual cycles and abnormal uterine bleeding of unknown etiology, hx of gastrectomy and 100 lb weight loss.  Partner SA: No Assessment    COUNSELING  We discussed causes of infertility " including hormonal, egg quality issues, structural problems such as endometriosis, adhesions, or tubal problems, uterine factors such as polyps or fibroids, and sperm issues. Reviewed evaluation of such as well. We discussed various methods for achieving pregnancy in some detail including, ovulation induction, insemination, superovulation and IVF.    We discussed the impact of age on fertility. We discussed that a woman is born with all of the follicles that she will have in her lifetime and that these numbers progressively decrease as the patient reaches menopause. We discussed that women can remain fertile into their late 30’s and even early 40’s, however, chance for success is significantly lower for women who have infertility. We also discussed the higher rates of aneuploidy and miscarriage that occur as women age.    Discussed potential etiologies of irregular menstrual cycles, discussed structural vs hormonal etiologies and work up for both. Briefly Discussed Ovulation Induction if SIS is normal.     Routine Testing  Fertility Center  STDs Within 1 year   Genetic carrier Waiver/Completed   T&S Within 1 year   AMH Within 1 year   TSH Within 1 year   Rubella/Varicella Within 5 years     PLAN  Orders Placed This Encounter   Procedures    Sonohysterogram    US sonohysterogram    Antimullerian Hormone (Amh)    TSH with reflex to Free T4 if abnormal    Prolactin    17-Hydroxyprogesterone    Progesterone    Type And Screen    Rubella Antibody, Igg    Varicella Zoster Antibody, Igg    Hepatitis B surface antigen    Hepatitis C Antibody    HIV 1/2 Antigen/Antibody Screen with Reflex to Confirmation    Syphilis Screen with Reflex    C. Trachomatis / N. Gonorrhoeae, Amplified Detection    Myriad Foresight Carrier Screen    Referral to Maternal Fetal Medicine    POCT pregnancy, urine manually resulted       GENETIC SCREENING PATIENT  Ordered    PARTNER  Yes Semen Analysis: Ordered  Yes Genetic screening:  Ordered    FOLLOW UP   Consults: MFM consult: indication:history of Sleeve Gastrectomy  Chart to primary nurse for care coordination and patient check list/education  Enroll in Engaged MD  Take prenatal vitamins, vitamin D 2000 IUs daily  Discussed that PAP and mammogram must be updated if appropriate based on age and clinical history and results received before treatment can begin  Discussed that treatment cannot proceed until checklist items are complete   6 week follow up with Any provider  Additional testing for BMI < 18 or > 40: No NA  Stat P4 If ovulatory await menses to schedule SIS, if negative P4 schedule SIS for the following day or so (if schedule allows).   If SIS normal and remainder of testing unremarkable will plan ovulation induction with TIC     MD Completion:  Ectopic Risk: No  Medically Complex: No    Fertility Plan Update:    Cherelle Mariano  05/06/2024  9:20 AM

## 2024-05-13 ENCOUNTER — LAB (OUTPATIENT)
Dept: LAB | Facility: LAB | Age: 34
End: 2024-05-13
Payer: COMMERCIAL

## 2024-05-13 DIAGNOSIS — N92.6 IRREGULAR MENSTRUAL CYCLE: ICD-10-CM

## 2024-05-13 DIAGNOSIS — Z13.29 SCREENING FOR THYROID DISORDER: ICD-10-CM

## 2024-05-13 DIAGNOSIS — Z11.3 SCREENING FOR STDS (SEXUALLY TRANSMITTED DISEASES): ICD-10-CM

## 2024-05-13 DIAGNOSIS — Z11.59 ENCOUNTER FOR SCREENING FOR OTHER VIRAL DISEASES: ICD-10-CM

## 2024-05-13 DIAGNOSIS — Z31.41 FERTILITY TESTING: ICD-10-CM

## 2024-05-13 DIAGNOSIS — Z01.83 ENCOUNTER FOR RH BLOOD TYPING: ICD-10-CM

## 2024-05-13 LAB
ABO GROUP (TYPE) IN BLOOD: NORMAL
ANTIBODY SCREEN: NORMAL
HBV SURFACE AG SERPL QL IA: NONREACTIVE
HCV AB SER QL: NONREACTIVE
HIV 1+2 AB+HIV1 P24 AG SERPL QL IA: NONREACTIVE
PROGEST SERPL-MCNC: 1.3 NG/ML
PROLACTIN SERPL-MCNC: 14 UG/L (ref 3–20)
RH FACTOR (ANTIGEN D): NORMAL
RUBV IGG SERPL IA-ACNC: 1.5 IA
RUBV IGG SERPL QL IA: POSITIVE
TREPONEMA PALLIDUM IGG+IGM AB [PRESENCE] IN SERUM OR PLASMA BY IMMUNOASSAY: NONREACTIVE
TSH SERPL-ACNC: 2 MIU/L (ref 0.44–3.98)
VARICELLA ZOSTER IGG INDEX: 2.5 IA
VZV IGG SER QL IA: POSITIVE

## 2024-05-13 PROCEDURE — 86900 BLOOD TYPING SEROLOGIC ABO: CPT

## 2024-05-13 PROCEDURE — 86850 RBC ANTIBODY SCREEN: CPT

## 2024-05-13 PROCEDURE — 36415 COLL VENOUS BLD VENIPUNCTURE: CPT

## 2024-05-13 PROCEDURE — 87591 N.GONORRHOEAE DNA AMP PROB: CPT

## 2024-05-13 PROCEDURE — 86317 IMMUNOASSAY INFECTIOUS AGENT: CPT

## 2024-05-13 PROCEDURE — 84443 ASSAY THYROID STIM HORMONE: CPT

## 2024-05-13 PROCEDURE — 87340 HEPATITIS B SURFACE AG IA: CPT

## 2024-05-13 PROCEDURE — 83498 ASY HYDROXYPROGESTERONE 17-D: CPT

## 2024-05-13 PROCEDURE — 86803 HEPATITIS C AB TEST: CPT

## 2024-05-13 PROCEDURE — 86787 VARICELLA-ZOSTER ANTIBODY: CPT

## 2024-05-13 PROCEDURE — 86901 BLOOD TYPING SEROLOGIC RH(D): CPT

## 2024-05-13 PROCEDURE — 84146 ASSAY OF PROLACTIN: CPT

## 2024-05-13 PROCEDURE — 87491 CHLMYD TRACH DNA AMP PROBE: CPT

## 2024-05-13 PROCEDURE — 83516 IMMUNOASSAY NONANTIBODY: CPT

## 2024-05-13 PROCEDURE — 84144 ASSAY OF PROGESTERONE: CPT

## 2024-05-13 PROCEDURE — 87389 HIV-1 AG W/HIV-1&-2 AB AG IA: CPT

## 2024-05-13 PROCEDURE — 86780 TREPONEMA PALLIDUM: CPT

## 2024-05-14 LAB
C TRACH RRNA SPEC QL NAA+PROBE: NEGATIVE
N GONORRHOEA DNA SPEC QL PROBE+SIG AMP: NEGATIVE

## 2024-05-16 LAB
17OHP SERPL-MCNC: 57.74 NG/DL
MIS SERPL-MCNC: 2.6 NG/ML (ref 0.18–11.71)

## 2024-05-17 ENCOUNTER — ANCILLARY PROCEDURE (OUTPATIENT)
Dept: ENDOCRINOLOGY | Facility: CLINIC | Age: 34
End: 2024-05-17
Payer: COMMERCIAL

## 2024-05-17 DIAGNOSIS — Z31.41 FERTILITY TESTING: ICD-10-CM

## 2024-05-17 DIAGNOSIS — N93.9 ABNORMAL UTERINE BLEEDING: Primary | ICD-10-CM

## 2024-05-17 DIAGNOSIS — Z01.812 ENCOUNTER FOR PREPROCEDURAL LABORATORY EXAMINATION: ICD-10-CM

## 2024-05-17 LAB — PREGNANCY TEST URINE, POC: NEGATIVE

## 2024-05-17 PROCEDURE — 81025 URINE PREGNANCY TEST: CPT | Performed by: NURSE PRACTITIONER

## 2024-05-17 PROCEDURE — 76831 ECHO EXAM UTERUS: CPT | Performed by: NURSE PRACTITIONER

## 2024-05-17 NOTE — PROGRESS NOTES
Patient ID: Mirela Fair is a 34 y.o. female.    Sonohysterogram    Date/Time: 5/17/2024 3:48 PM    Performed by: ALFONSO Billy  Authorized by: ALFONSO Billy    Consent:     Consent obtained:  Verbal and written    Consent given by:  Patient    Procedural risks discussed:  Bleeding, infection, possible continued pain and repeat procedure    Patient questions answered: yes      Patient agrees, verbalizes understanding, and wants to proceed: yes      Instructions and paperwork completed: yes    Pre-procedure:     Pre-procedure timeout performed: yes      Prepped with: Betadine    Procedure:     Cervix cleaned and prepped: yes      Cervix dilated: no      Tenaculum applied to cervix: no      Uterus sounded: no      Catheter inserted: yes      Uterine cavity distended with saline: yes    Post-procedure:     No complications: no      Estimated blood loss (mL): minimal.    Post procedure instructions given to patient: yes      Patient tolerated procedure well with no complications: yes    Comments:      Prior to the procedure, the patient was counseled regarding risks, benefits, and alternatives.    Saline Ultrasound Findings:  Uterus:  normal contour without filling defects  Bubble Test performed: No  Additional Findings:   Follow up:  No further follow up required.

## 2024-05-30 LAB — SCAN RESULT: NORMAL

## 2024-06-10 ENCOUNTER — APPOINTMENT (OUTPATIENT)
Dept: ENDOCRINOLOGY | Facility: CLINIC | Age: 34
End: 2024-06-10
Payer: COMMERCIAL

## 2024-06-13 ENCOUNTER — TELEMEDICINE (OUTPATIENT)
Dept: ENDOCRINOLOGY | Facility: CLINIC | Age: 34
End: 2024-06-13
Payer: COMMERCIAL

## 2024-06-13 VITALS — WEIGHT: 156 LBS | HEIGHT: 64 IN | BODY MASS INDEX: 26.63 KG/M2

## 2024-06-13 DIAGNOSIS — N97.9 FEMALE INFERTILITY: Primary | ICD-10-CM

## 2024-06-13 PROCEDURE — 1036F TOBACCO NON-USER: CPT | Performed by: NURSE PRACTITIONER

## 2024-06-13 PROCEDURE — 99213 OFFICE O/P EST LOW 20 MIN: CPT | Performed by: NURSE PRACTITIONER

## 2024-06-13 ASSESSMENT — PAIN SCALES - GENERAL: PAINLEVEL: 0-NO PAIN

## 2024-06-13 NOTE — PROGRESS NOTES
Virtual or Telephone Consent: An interactive audio and video telecommunication system which permits real time communications between the patient (at the originating site) and provider (at the distant site) was utilized to provide this telehealth service and verbal consent was requested and obtained from Mirela Fair on this date, 24 for a telehealth visit.    Follow Up Visit HPI    Patient is a 34 y.o.  female with Unexplained Infertility presenting today for follow up visit.     Testing to date:   Result Date Done   TSH: 2.00 (Ref range: 0.44 - 3.98 mIU/L) 2024   AMH: 2.600 (Ref range: 0.176 - 11.705 ng/mL) 2024   PRL: 14.0 (Ref range: 3.0 - 20.0 ug/L) 2024   Testosterone: No results found for requested labs within last 365 days. No results found for requested labs within last 365 days.   DHEAS: 203 (Ref range: 12 - 379 ug/dL) 3/22/2024   Other:     Hysterosalpingogram:   Saline Infused Sonography: 2024  Report not finalized  Images appear normal, no intracavitary filling defects     Partner SA: No Assessment    Treatment to date:   Fertility Cycles       Cycle Name Treatment Start Date Type Outcome    Cycle #1   Active            Past Medical History:   Diagnosis Date    Disorder of lipoprotein metabolism, unspecified     Borderline high cholesterol    Morbid (severe) obesity due to excess calories (Multi) 2021    Morbid obesity with BMI of 40.0-44.9, adult     Past Surgical History:   Procedure Laterality Date    OTHER SURGICAL HISTORY  2020    [place] tooth extraction    OTHER SURGICAL HISTORY  2021    Bariatric surgery     Current Outpatient Medications on File Prior to Visit   Medication Sig Dispense Refill    biotin 5,000 mcg tablet,disintegrating Take 1 tablet by mouth early in the morning..      buPROPion SR (Wellbutrin SR) 200 mg 12 hr tablet TAKE 1 TABLET DAILY. 90 tablet 3    calcium carbonate (Oscal) 500 mg calcium (1,250 mg) tablet Take by mouth.       "cyanocobalamin (Vitamin B-12) 1,000 mcg tablet Take by mouth.      mv-calcium-min-iron fm-FA-vitK (Multi For Her) 18 mg iron-600 mcg-80 mcg tablet Take by mouth.       No current facility-administered medications on file prior to visit.       BMI:   BMI Readings from Last 1 Encounters:   24 26.78 kg/m²     VITALS:  Ht 1.626 m (5' 4\")   Wt 70.8 kg (156 lb)   LMP 2024 (Exact Date)   BMI 26.78 kg/m²   LMP: Patient's last menstrual period was 2024 (exact date).    ASSESSMENT   34 y.o.  female with primary subfertility x 8 months, suspected oligoovulation and the following pertinent medical issues: Irregular menstrual cycles and abnormal uterine bleeding of unknown etiology, hx of gastrectomy and 100 lb weight loss.     COUNSELING  We discussed using Ovulation Predictor Kits to time fertility treatments.   Patient aware we recommend Clear Blue Easy Digital OPK (not advanced) however there are several choices for OPK on the market and any one that she chooses is fine to use.     We discussed that she should begin testing on cycle day 10 (Day 1 is first day of full flow menses).   We discussed that she is to use the second urine of the morning and call the office with + OPK prior to 4 pm that day if planning IUI.  If not planning IUI, patient advised to have intercourse day of + OPK.    We discussed that Letrozole is used for ovulation induction and that recent studies have found letrozole is superior to Clomid for ovulation induction in patients with PCOS. We discussed common side effects of Letrozole including headaches, vision changes, hot flashes, mood changes, and breast tenderness.  Letrozole is NOT FDA approved for the treatment of infertility and was initially associated with (in some studies) a higher risk of congenital anomalies, although this was not found in a more recent large study of patients taking Letrozole for unexplained infertility. Patients must take a pregnancy test prior " to starting Letrozole each month. We discussed that there is an increased risk of multiple gestation with Letrozole approximately 10% for twins and less than 1% for triplets.  Counseled regarding option of selective reduction for higher order multiples.      PLAN  Ok to defer HSG for 3 ovulatory cycles   OK to defer partner testing for 3 ovulatory cycles if pt chooses       FOLLOW UP   Consults: MFM consult: indication:history of Sleeve Gastrectomy.  Pt and partner both + carrier screening but no concordance recommend genetic counseling   Engaged MD  Take prenatal vitamins, vitamin D 2000 IUs daily  Discussed that treatment cannot proceed until checklist items are complete.   Additional testing for BMI < 18 or > 40: No NA.  Patient to schedule follow up visit in 3 months. Advised patient to arrange this now with the .  Chart to primary nurse for care coordination and patient check list/education.  Letrozole 2.5 mg CD 3-7 with TIC and CD 21 to confirm ovulation if no conception within 3 cycles and or no ovulation on OI meds recommend FUV     MD Completion:  Ectopic Risk: No  Medically Complex: hx of sleeve gastrectomy  Outstanding boarding pass items: [facility] consult  Please call to schedule [phone]    Cherelle Mariano  06/13/2024  3:42 PM

## 2024-06-19 ENCOUNTER — APPOINTMENT (OUTPATIENT)
Dept: MATERNAL FETAL MEDICINE | Facility: CLINIC | Age: 34
End: 2024-06-19
Payer: COMMERCIAL

## 2024-06-25 ASSESSMENT — PROMIS GLOBAL HEALTH SCALE
RATE_PHYSICAL_HEALTH: GOOD
RATE_SOCIAL_SATISFACTION: GOOD
RATE_QUALITY_OF_LIFE: GOOD
RATE_AVERAGE_PAIN: 4
RATE_MENTAL_HEALTH: GOOD
CARRYOUT_SOCIAL_ACTIVITIES: GOOD
RATE_GENERAL_HEALTH: GOOD
EMOTIONAL_PROBLEMS: SOMETIMES
RATE_AVERAGE_FATIGUE: MODERATE
CARRYOUT_PHYSICAL_ACTIVITIES: COMPLETELY

## 2024-06-25 NOTE — PROGRESS NOTES
"Subjective   Reason for Visit: Mirela Fair is an 34 y.o. female here for a CPE     Chief Complaint   Patient presents with    Annual Exam     Mirela Fair is a 34 y.o. female is here today for a CPE. Patient reports wanting to change or get off her wellbutrin because she is seeing a fertility specialist who said it is not good to be taking when pregnant.         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 Biotin, calcium, B12, MVI    LMP: 6/1/2024  PAP: 2022  GYN: STACY Farrar     Fasting blood work: Defer at this time   HIV/HEP C Screening: Done   Last eye exam: 2022  Last dental Exam: 2024  Exercise: regularly   Mood: no concerns   Sleep: no concerns   Vaccines: needs Tdap     Pt is currently working with  [facility]   Pt will be starting Clomid soon   Is concerned about if she needs a new anti-depressant that is safe during pregnancy     Pt was being treated for depression w/Wellbutrin 200 mg daily  Pt states \"I ran out of Wellbutrin 3 weeks ago\"  Denies any w/d symptoms   PHQ today= 2/2  Did therapy previously, last time was @ 3 years ago and states she would like to try that again and hold off on medication at this time.       Allergies   Allergen Reactions    Ibuprofen Other         Ancillary Procedure on 05/17/2024   Component Date Value Ref Range Status    Preg Test, Ur 05/17/2024 Negative  Negative Final   Lab on 05/13/2024   Component Date Value Ref Range Status    Anti-Mullerian " Hormone 05/13/2024 2.600  0.176 - 11.705 ng/mL Final    Comment: INTERPRETIVE INFORMATION: Anti-Mullerian Hormone    FEMALE:  6 months - 14 years: 0.256 - 6.345 ng/mL  15-17 years:         0.861 - 10.451 ng/mL  18-29 years:         0.401 - 16.015 ng/mL  30-39 years:         0.176 - 11.705 ng/mL  40-45 years:         6.282 ng/mL or less  46-50 years:         0.064 ng/mL or less  Post-menopausal:     0.003 ng/mL or less    MALE:  6-11 months:         56.677 - 495.299 ng/mL  1-6 years:           33.442 - 342.450 ng/mL  7-9 years:           20.245 - 189.781 ng/mL  10-12 years:         2.903 - 178.243 ng/mL  13 years and older:  2.079 - 30.656 ng/mL    This test was developed and its performance characteristics   determined by [facility]. It has not been cleared or   approved by the US Food and Drug Administration. This test was   performed in a CLIA certified laboratory and is intended for   clinical purposes.  Performed By: [facility]  [address] [place] [id number]  : Zhou Bruno MD, PhD  CLIA                            Number: [id number]    Thyroid Stimulating Hormone 05/13/2024 2.00  0.44 - 3.98 mIU/L Final    Prolactin 05/13/2024 14.0  3.0 - 20.0 ug/L Final    17-Hydroxyprogesterone 05/13/2024 57.74  <=206.00 ng/dL Final      INTERPRETIVE INFORMATION for 17-Hydroxyprogesterone in females:    Follicular                  15 to 70 ng/dL  Luteal                      35 to 290 ng/dL  REFERENCE INTERVAL: 17-Hydroxyprogesterone Qnt, HPLC-MS/MS    Access complete set of age- and/or gender-specific reference   intervals for this test in the [facility]   (Gold Standard Diagnostics).    This test was developed and its performance characteristics   determined by [facility]. It has not been cleared or   approved by the US Food and Drug Administration. This test was   performed in a CLIA certified laboratory and is intended for   clinical purposes.  Performed By:  [address] [address]  : Zhou Bruno MD, PhD  CLIA Number: [id number]    Progesterone 05/13/2024 1.3  ng/mL Final    Ref Values  Male              <0.3- 1.2    Follicular Phase  <0.3- 1.4       Luteal Phase       3.3-25.6     Mid-Luteal Phase   4.4-28.0  Postmenopausal    <0.3- 0.7  Pregnant Females:     1st Trimester     11.2- 90.0         2nd Trimester     25.6- 89.4     3RD Trimester     48.4-422.5     Patients receiving DHEA-S supplements may show false elevation of progesterone for results near 1.0 ng/mL. Contact laboratory at [phone] if alternative testing is needed.      ABO TYPE 05/13/2024 O   Final    Rh TYPE 05/13/2024 POS   Final    ANTIBODY SCREEN 05/13/2024 NEG   Final    Rubella, IgG 05/13/2024 Positive  Negative Final    Rubella, IgG Index 05/13/2024 1.5  <=0.7 IA IA Final    Varicella Zoster, IgG 05/13/2024 Positive (A)  Negative Final    Varicella Zoster, IgG Index 05/13/2024 2.5 (H)  <=0.8 IA Final    Hepatitis B Surface AG 05/13/2024 Nonreactive  Nonreactive Final    Biotin interference may cause falsely decreased results. Patients taking a Biotin dose of up to 5 mg/day should refrain from taking Biotin for 24 hours before sample collection. Providers may contact their local laboratory for  further information.    Hepatitis C AB 05/13/2024 Nonreactive  Nonreactive Final    Results from patients taking biotin supplements or receiving high-dose biotin therapy should be interpreted with caution due to possible interference with this test. Providers may contact their local laboratory for further information.    HIV 1/2 Antigen/Antibody Screen wi* 05/13/2024 Nonreactive  Nonreactive Final    Syphilis Total Ab 05/13/2024 Nonreactive  Nonreactive Final    No significant level of Treponema pallidum antibody detected.   Repeat testing in 2 to 4 weeks may be considered if early   infection or incubating syphilis infection is suspected.  "   Neisseria gonorrhea,Amplified 05/13/2024 Negative  Negative Final    Chlamydia trachomatis, Amplified 05/13/2024 Negative  Negative Final   Consult on 05/06/2024   Component Date Value Ref Range Status    Scan Result 05/13/2024 See Scanned Result   Final         Patient Care Team:  ALFONSO Noguera as PCP - General (Family Medicine)  [facility]  Betty Lu RN as Registered Nurse     Review of Systems  ROS was completed and all systems are negative with the exception of what was noted in the the HPI.       Objective   Vitals:  /71   Pulse 88   Ht 1.626 m (5' 4\")   Wt 71.3 kg (157 lb 3.2 oz)   LMP 06/01/2024 (Exact Date)   SpO2 96%   BMI 26.98 kg/m²       Current Outpatient Medications   Medication Instructions    biotin 5,000 mcg tablet,disintegrating 1 tablet, oral, Daily    calcium carbonate (Oscal) 500 mg calcium (1,250 mg) tablet oral    cyanocobalamin (Vitamin B-12) 1,000 mcg tablet oral    mv-calcium-min-iron fm-FA-vitK (Multi For Her) 18 mg iron-600 mcg-80 mcg tablet oral       Physical Exam  Vitals reviewed.   HENT:      Mouth/Throat:      Mouth: Mucous membranes are moist.   Eyes:      Conjunctiva/sclera: Conjunctivae normal.   Cardiovascular:      Pulses: Normal pulses.      Heart sounds: Normal heart sounds.   Pulmonary:      Effort: Pulmonary effort is normal.      Breath sounds: Normal breath sounds.   Abdominal:      General: Bowel sounds are normal.   Skin:     General: Skin is warm and dry.   Neurological:      Mental Status: She is alert and oriented to person, place, and time.   Psychiatric:         Mood and Affect: Mood normal.         Assessment/Plan   Problem List Items Addressed This Visit             ICD-10-CM    Depression F32.A     Weaned off Wellbutrin June 2024  PHQ= 2/2 today  Referral for therapy   Denies SI/HI         Relevant Orders    Referral to [facility]    Annual physical exam - Primary Z00.00    "  - Counseled on healthy diet and regular exercise  - Fall avoidance information provided  - Personalized prevention plan provided   - PAP  UTD  - Vaccines; needs Tdap

## 2024-06-26 ENCOUNTER — APPOINTMENT (OUTPATIENT)
Dept: PRIMARY CARE | Facility: CLINIC | Age: 34
End: 2024-06-26
Payer: COMMERCIAL

## 2024-06-26 VITALS
OXYGEN SATURATION: 96 % | DIASTOLIC BLOOD PRESSURE: 71 MMHG | SYSTOLIC BLOOD PRESSURE: 101 MMHG | HEART RATE: 88 BPM | HEIGHT: 64 IN | BODY MASS INDEX: 26.84 KG/M2 | WEIGHT: 157.2 LBS

## 2024-06-26 DIAGNOSIS — F33.41 RECURRENT MAJOR DEPRESSIVE DISORDER, IN PARTIAL REMISSION (CMS-HCC): ICD-10-CM

## 2024-06-26 DIAGNOSIS — Z00.00 ANNUAL PHYSICAL EXAM: Primary | ICD-10-CM

## 2024-06-26 PROCEDURE — 99395 PREV VISIT EST AGE 18-39: CPT | Performed by: NURSE PRACTITIONER

## 2024-06-26 PROCEDURE — 1036F TOBACCO NON-USER: CPT | Performed by: NURSE PRACTITIONER

## 2024-06-26 ASSESSMENT — PATIENT HEALTH QUESTIONNAIRE - PHQ9
10. IF YOU CHECKED OFF ANY PROBLEMS, HOW DIFFICULT HAVE THESE PROBLEMS MADE IT FOR YOU TO DO YOUR WORK, TAKE CARE OF THINGS AT HOME, OR GET ALONG WITH OTHER PEOPLE: NOT DIFFICULT AT ALL
SUM OF ALL RESPONSES TO PHQ9 QUESTIONS 1 & 2: 2
1. LITTLE INTEREST OR PLEASURE IN DOING THINGS: SEVERAL DAYS
2. FEELING DOWN, DEPRESSED OR HOPELESS: SEVERAL DAYS

## 2024-06-26 NOTE — PATIENT INSTRUCTIONS
Thank you for seeing me today.  It was a pleasure to see you again!    Today we did your Annual Physical Exam and discussed the following:     #DEPRESSION  PHQ= 2  Counseling advised  Call if worsening sx and we can begin Sertraline daily  Regular exercise encouraged  Stress reduction techniques       For assistance with scheduling referrals or consultations, please call [phone] or [phone].    For laboratory, radiology, and other tests, please call [phone] ([phone] for pediatrics).   If you do not get results within 7-10 days, or you have any questions or concerns, please send a message, call the office ([phone]), or return to the office for a follow-up appointment.     For acute/sick visits, if you are unable to get an office visit, you can do a  On Demand Virtual Visit that is accessible via your My Chart account.  For emergencies, call 9-1-1 or go to the nearest Emergency Department.     Please schedule additional appointment(s) to address concern(s) not addressed today.    Please review prescription inserts and published information for possible adverse effects of all medications.     In general, results are discussed over the phone or via  [employer].     You can see your health information, review clinical summaries from office visits & test results online when you follow your health with MY  Chart, a personal health record.   To sign up go to [email or web address].   If you need assistance with signing up or trouble getting into your account call [facility] Patient Line 24/7 at [phone]     RTC ANNUALLY AND AS NEEDED

## 2024-06-26 NOTE — ASSESSMENT & PLAN NOTE
- Counseled on healthy diet and regular exercise  - Fall avoidance information provided  - Personalized prevention plan provided   - PAP  UTD  - Vaccines; needs Tdap

## 2024-07-07 NOTE — PROGRESS NOTES
"Niall Dietz is a 34 y.o. female who presents for Mass (Left side, this week).    HPI    Patient presents to office for c/o breast lump. She reports noticing lump on L breast lower breast a few days ago. She denies breast tenderness, nipple discharge, skin changes.     Objective   /60   Ht 1.626 m (5' 4\")   Wt 72.1 kg (159 lb)   LMP 06/30/2024 (Exact Date)   BMI 27.29 kg/m²   Physical Exam  Constitutional:       Appearance: Normal appearance.   Genitourinary:   Breasts:     Breasts are symmetrical.      Breasts are soft.     Right: Normal. No mass, nipple discharge, skin change or tenderness.      Left: Mass present. No nipple discharge, skin change or tenderness.   Pulmonary:      Effort: Pulmonary effort is normal.   Chest:       Neurological:      General: No focal deficit present.      Mental Status: She is alert and oriented to person, place, and time. Mental status is at baseline.   Skin:     General: Skin is warm and dry.   Psychiatric:         Mood and Affect: Mood normal.         Behavior: Behavior normal.         Thought Content: Thought content normal.         Judgment: Judgment normal.     Assessment/Plan   Diagnoses and all orders for this visit:  Abnormal breast exam  -     BI US breast limited left; Future    Follow up if symptoms worsen or fail to improve.  Analia Shirley, APRN-CNM, APRN-CNP  "

## 2024-07-09 ENCOUNTER — APPOINTMENT (OUTPATIENT)
Dept: OBSTETRICS AND GYNECOLOGY | Facility: CLINIC | Age: 34
End: 2024-07-09
Payer: COMMERCIAL

## 2024-07-09 VITALS
DIASTOLIC BLOOD PRESSURE: 60 MMHG | SYSTOLIC BLOOD PRESSURE: 100 MMHG | BODY MASS INDEX: 27.14 KG/M2 | WEIGHT: 159 LBS | HEIGHT: 64 IN

## 2024-07-09 DIAGNOSIS — N64.59 ABNORMAL BREAST EXAM: Primary | ICD-10-CM

## 2024-07-09 PROCEDURE — 1036F TOBACCO NON-USER: CPT | Performed by: NURSE PRACTITIONER

## 2024-07-09 PROCEDURE — 99213 OFFICE O/P EST LOW 20 MIN: CPT | Performed by: NURSE PRACTITIONER

## 2024-07-24 ENCOUNTER — INITIAL PRENATAL (OUTPATIENT)
Dept: MATERNAL FETAL MEDICINE | Facility: CLINIC | Age: 34
End: 2024-07-24
Payer: COMMERCIAL

## 2024-07-24 ENCOUNTER — APPOINTMENT (OUTPATIENT)
Dept: MATERNAL FETAL MEDICINE | Facility: CLINIC | Age: 34
End: 2024-07-24
Payer: COMMERCIAL

## 2024-07-24 DIAGNOSIS — N63.0 BREAST LUMP IN FEMALE: ICD-10-CM

## 2024-07-24 DIAGNOSIS — Z31.69 ENCOUNTER FOR PRECONCEPTION CONSULTATION: Primary | ICD-10-CM

## 2024-07-24 DIAGNOSIS — Z98.84 S/P LAPAROSCOPIC SLEEVE GASTRECTOMY: ICD-10-CM

## 2024-07-24 DIAGNOSIS — O09.519 ADVANCED MATERNAL AGE, PRIMIGRAVIDA, ANTEPARTUM (HHS-HCC): ICD-10-CM

## 2024-07-24 PROCEDURE — 99213 OFFICE O/P EST LOW 20 MIN: CPT | Performed by: OBSTETRICS & GYNECOLOGY

## 2024-07-24 PROCEDURE — 99213 OFFICE O/P EST LOW 20 MIN: CPT | Mod: 95 | Performed by: OBSTETRICS & GYNECOLOGY

## 2024-07-24 NOTE — PROGRESS NOTES
Virtual or Telephone Consent    An interactive audio and video telecommunication system which permits real time communications between the patient (at the originating site) and provider (at the distant site) was utilized to provide this telehealth service.     Verbal consent was requested and obtained from Mirela Fair on this date, 24 for a telehealth visit.        Mirela Fair is a 34 y.o. G0 presenting for an [facility]FM [facility] due to history of a sleeve gastrectomy and AMA. She is without complaints today. ROS is negative.      Issues:   Hx of sleeve gastrectomy in early - 100lbs weight loss, normal BMI now. No side effects. No dumping syndrome.   AMA- age 35yo  Recent finding of breast lump- workup in progress     OBHx: nullipara  GynHx: uterine polyps  MedHx: now resolved obesity, recently noted breast lump   SurgHx: sleeve gastrectomy, OpHSC for uterine polyps  Meds: MVI, calcium, biotin, B12  All: NKDA, cannot take NSAIDs due to sleeve  FamHx: noncontributory   SocHx: no toxic habits     O: Vitals and physical exam not available for this virtual visit    A/P: 35yo G0 presenting for a preconception consultation. We discussed the following:   Hx of gastric sleeve with 100lb weight loss, current BMI 27  As you know Mirela Fair has a history of bariatric surgery, specifically a sleeve gastrectomy, in early . We discussed the pregnancy implications of patients with a history of a gastric sleeve.  Specifically, we reviewed the importance of continuing nutritional supplementation during the pregnancy and that with the increased nutritional demands from pregnancy additional supplementation is required and therefore we recommend regular assessment each trimester of her nutritional status with additional supplementation as needed.  Furthermore we discussed that though gestational weight gain has not specifically been studied in women after gastric surgery  we recommend the same goals for gestational weight gain based on BMI as a patient without gastric bypass.   We also discussed the association of intrauterine growth restriction we recommend serial growth ultrasound for screening.  We discussed that long term surgical complications can arise in pregnancy (bowel obstruction, herniation etc) and that pregnancy can make diagnosis of such conditions difficult and there should be a low threshold to consider evaluation if symptoms develop (sudden abdominal pain, nausea, vomiting, obstipation etc.). Finally, we discussed that women with a history of a gastric sleeve typically do very well in pregnancy and have minimal complications as compared to those with more malabsorptive type procedures like a Jen-en-Y or duodenal switch.     We recommend the followin. Laboratory assessment of nutritional status every trimester which should include:        complete blood count   ferritin   iron   vitamin B12   thiamine   folate   calcium   vitamin D  2. Level 2 anatomic survey  3. Serial growth ultrasound every 4 weeks starting at 28 weeks gestation  4.  should be reserved for regular obstetric indications  5. Delivery at 39 weeks unless other complications arise.   6. Routine GDM screening with 1hr GCT as she has no history of dumping syndrome.     2. AMA  We discussed the implications of advanced maternal age on pregnancy.  Specifically we discussed the increased risk of aneuploidy and her age-related risks We discussed our recommendation for aneuploidy screening and detailed anatomic study.  We also discussed the increase risk of hypertensive disorders and gestational diabetes as well as growth restriction with advancing maternal age.  Finally we discussed the increased risk of stillbirth associated with older maternal age, particularly at term and our recommendation for delivery at 39 weeks.      3. Breast lump  Strongly advised patient to have this fully assessed  prior to fertility treatments.     4. Prenatal care plan  She will follow with Dr. Farrar for primary OB care and see MFM for ultrasounds.     In summary, I see no overt contraindications to assisted reproduction after breast lump is evaluated. Thank you for allowing me to participate in the care of your patient. Please do not hesitate to contact me with any further questions.      Oksana Zhang MD  Maternal Fetal Medicine  Director of Fetal Intervention

## 2024-07-29 ENCOUNTER — HOSPITAL ENCOUNTER (OUTPATIENT)
Dept: RADIOLOGY | Facility: HOSPITAL | Age: 34
Discharge: HOME | End: 2024-07-29
Payer: COMMERCIAL

## 2024-07-29 ENCOUNTER — TELEPHONE (OUTPATIENT)
Dept: ENDOCRINOLOGY | Facility: CLINIC | Age: 34
End: 2024-07-29
Payer: COMMERCIAL

## 2024-07-29 ENCOUNTER — TELEPHONE (OUTPATIENT)
Dept: OBSTETRICS AND GYNECOLOGY | Facility: CLINIC | Age: 34
End: 2024-07-29
Payer: COMMERCIAL

## 2024-07-29 DIAGNOSIS — N63.0 LUMP IN FEMALE BREAST: ICD-10-CM

## 2024-07-29 DIAGNOSIS — N64.59 ABNORMAL BREAST EXAM: ICD-10-CM

## 2024-07-29 NOTE — PROGRESS NOTES
Serenity from radiology called in and stated pt had the wrong placed for her mammogram. Order was placed again for a diagnostic bilateral viewing instead.

## 2024-07-29 NOTE — TELEPHONE ENCOUNTER
Contacted pt and verified name and .  Pt states they would not perform her mammogram without a provider signature, Analia signed order now and pt can be seen.  Patient states understanding and has no questions at this time.

## 2024-07-29 NOTE — Clinical Note
Boarding pass for review. Will be put on for a virtual visit to review (wanted to send to you both depending whos schedule she got put on) for letrozole 2.5mg with TIC. She will need Letrozole and Prog lab order ordered once cleared

## 2024-07-29 NOTE — TELEPHONE ENCOUNTER
Caller:   Reason for call: Medication request  Medication requested:   [facility] [address] [place]

## 2024-07-29 NOTE — TELEPHONE ENCOUNTER
Returned call to patient. Patient started menses today -- however BP cannot be signed off as patient currently has a breast lump that is to be evaluated by US prior to clearance to proceed. Patient stated she is current en route to her appointment to have this evaluated.     Relayed to patient that as long as she can get those results / final report -- we can plan to proceed. Otherwise patient will need to wait until next cycle to start letrozole. Patient verbalized understanding, all questions addressed at this time. Patient to call back if able to have final report posted in time to start letrozole.     ROGELIO CUMMINGS RN 07/29/2024 10:37

## 2024-08-05 ENCOUNTER — HOSPITAL ENCOUNTER (OUTPATIENT)
Dept: RADIOLOGY | Facility: HOSPITAL | Age: 34
Discharge: HOME | End: 2024-08-05
Payer: COMMERCIAL

## 2024-08-05 VITALS — WEIGHT: 160 LBS | HEIGHT: 64 IN | BODY MASS INDEX: 27.31 KG/M2

## 2024-08-05 DIAGNOSIS — N64.59 ABNORMAL BREAST EXAM: ICD-10-CM

## 2024-08-05 PROCEDURE — 76642 ULTRASOUND BREAST LIMITED: CPT | Mod: 50,59

## 2024-08-05 PROCEDURE — 76642 ULTRASOUND BREAST LIMITED: CPT | Performed by: RADIOLOGY

## 2024-08-05 PROCEDURE — 77062 BREAST TOMOSYNTHESIS BI: CPT

## 2024-08-05 PROCEDURE — 76982 USE 1ST TARGET LESION: CPT | Mod: LT

## 2024-08-05 PROCEDURE — 77066 DX MAMMO INCL CAD BI: CPT | Performed by: RADIOLOGY

## 2024-08-05 PROCEDURE — 77062 BREAST TOMOSYNTHESIS BI: CPT | Performed by: RADIOLOGY

## 2024-08-27 ENCOUNTER — TELEPHONE (OUTPATIENT)
Dept: ENDOCRINOLOGY | Facility: CLINIC | Age: 34
End: 2024-08-27
Payer: COMMERCIAL

## 2024-08-27 NOTE — TELEPHONE ENCOUNTER
Caller:   Reason for call: Medication request  Medication requested:   Got period today, needs letrozole called into her pharmacy

## 2024-08-27 NOTE — TELEPHONE ENCOUNTER
Returned patient's call. Patient calling with LMP today 8/27. Will schedule for virtual BP hours tomorrow with NP - transferred to FD to schedule after reviewing tentative cycle. Patient needed updated mammogram due to finding of lump, and plan is to repeat in one year.     Patient called with menses for TIC cycle  Cycle #:  1  Medication: Letrozole  Ovulation: LH Kit  Sperm Source:  partner fresh    Luteal Support:  n/a  Additional Medications:  n/a  Boarding Pass signed off: sent to virtual BP hours tomorrow and NP will place orders for medications     Lluvia Strickland 08/27/24 2:19 PM

## 2024-08-28 ENCOUNTER — TELEMEDICINE (OUTPATIENT)
Dept: ENDOCRINOLOGY | Facility: CLINIC | Age: 34
End: 2024-08-28
Payer: COMMERCIAL

## 2024-08-28 DIAGNOSIS — N92.6 IRREGULAR PERIODS/MENSTRUAL CYCLES: Primary | ICD-10-CM

## 2024-08-28 DIAGNOSIS — N97.9 FEMALE INFERTILITY: ICD-10-CM

## 2024-08-28 PROCEDURE — 99213 OFFICE O/P EST LOW 20 MIN: CPT | Performed by: NURSE PRACTITIONER

## 2024-08-28 PROCEDURE — 1036F TOBACCO NON-USER: CPT | Performed by: NURSE PRACTITIONER

## 2024-08-28 RX ORDER — LETROZOLE 2.5 MG/1
2.5 TABLET, FILM COATED ORAL DAILY
Qty: 5 TABLET | Refills: 0 | Status: SHIPPED | OUTPATIENT
Start: 2024-08-28 | End: 2024-11-26

## 2024-08-28 NOTE — Clinical Note
Addendum for one more cycle of letrozole TIC - she is going to talk to partner tonight and will decide if they want to do one more cycle or not

## 2024-08-28 NOTE — PROGRESS NOTES
Boarding Pass Oral TIC/IUI     Age: 34 y.o.    Provider: Cherelle Mariano CNP  Primary RN: Adenike/ Betty  Reasons for Treatment: Unexplained Infertility  Last BMI  24 : 27.29 kg/m²         Medical History        Past Medical History:   Diagnosis Date    Disorder of lipoprotein metabolism, unspecified       Borderline high cholesterol    Morbid (severe) obesity due to excess calories (Multi) 2021     Morbid obesity with BMI of 40.0-44.9, adult            Date Done Consultation Results/Comments   2024 Medication Protocol    Letrozole 2.5 mg CD 3-7 with TIC and CD 21 to confirm ovulation if no conception within 3 cycles and or no ovulation on OI meds recommend FUV    2024 MFM Consult    In summary, I see no overt contraindications to assisted reproduction after breast lump is evaluated. -- at this time (24) patient is to have breast lump evaluated by US ordered by her OBGYN.    Date Done Female Labs Results/Comments   2024 T&S (Q 1 Year) ABO: O  Rh: POS  Antibody: NEG      2024 Hep B sAg Nonreactive   2024 Hep C AB Nonreactive   2024 HIV Nonreactive   2024 Syphilis Nonreactive   2024 GC/CT GC: Negative  CT: Negative   2024 Rubella (Q 5 Years) Positive   2024 Varicella (Q 5 Years) Positive   2024 Carrier Screening [facility] 2bP: PlasmaSiIGHT CARRIER SCREEN (2024):   Authorization completed  Pos biotinidase deficiency, medium-chain acyl-CoA dehydrogenase deficiency    Date Done Male Labs (required if IUI)    Results/Comments  GOLDY BRISENO (MRN: [id number])                                           2024 Carrier Screening *[place] FORESIGHT CARRIER SCREEN (2024):   Authorization completed  Pos congenital adrenal hyperplasia, [id number]; congenital disorder of glycosylation, PMM2-related            Date Done Miscellaneous Results/Comments   2024 Mammogram  INDICATION:  Signs/Symptoms:lump left breast;  Signs/Symptoms:patient reports lump  in LLQ quadrant of L breast.      COMPARISON:  None.      FINDINGS:  MAMMOGRAPHY: 2D and tomosynthesis images were reviewed at 1 mm slice  thickness.      Density:  The breast tissue is heterogeneously dense, which may  obscure small masses.      There is a low-density mass within the upper-outer quadrant of the  right breast at posterior depth. No additional suspicious masses or  calcifications are identified.      ULTRASOUND:  Targeted ultrasound of the right breast was performed in  the area of mammographic abnormality.      At 10 o'clock position, 9 cm from the nipple there is a  benign-appearing lymph node measuring approximately 5 x 4 mm in size.  It shows thin homogeneous cortex and increased through transmission.  No additional suspicious masses are seen.      Targeted ultrasound of the left breast was performed in the area of  palpable lump. Thick bands of normal appearing breast tissues are  seen at 4 o'clock position, 5 cm from the nipple. There is no  evidence for cystic or solid lesions identified. Benign features on  elastography. No findings to suggest breast carcinoma.      IMPRESSION:  No mammographic or sonographic evidence of malignancy. Clinical  correlation recommended for patient's symptoms.      BI-RADS CATEGORY:      BI-RADS Category:  2 Benign.  Recommendation:  Annual Screening.  Recommended Date:  1 Year.  Laterality:  Bilateral.              MD Completion:  Ectopic Risk: No  Medically Complex: Yes -- hx of sleeve gastrectomy              Boarding pass reviewed with: patient  LMP 8/27/24  Protocol: Letrozole 2.5mg/TI x 3. Plan on cd 21 first cycle to confirm ovualation.  9/16- huddle for cd 21 progesterone  Sperm: partner  Testing up to date. yes  Procedure order placed. yes    Boarding pass approved for 3 cycles/until 5/25    Eri Landaverde 08/28/24 1:22 PM    MD note:   Reviewed and due to partners results plan on another TI x1 cycle. Lbs up to date  until 5/25.    Eri Landaverde 03/18/25 4:33 PM

## 2024-09-01 ENCOUNTER — HOSPITAL ENCOUNTER (EMERGENCY)
Facility: HOSPITAL | Age: 34
Discharge: HOME | End: 2024-09-01
Payer: COMMERCIAL

## 2024-09-01 VITALS
RESPIRATION RATE: 16 BRPM | WEIGHT: 165.12 LBS | OXYGEN SATURATION: 99 % | TEMPERATURE: 97.9 F | BODY MASS INDEX: 28.19 KG/M2 | HEIGHT: 64 IN | SYSTOLIC BLOOD PRESSURE: 122 MMHG | DIASTOLIC BLOOD PRESSURE: 79 MMHG | HEART RATE: 61 BPM

## 2024-09-01 DIAGNOSIS — S00.459A INFECTED EMBEDDED EARRING: ICD-10-CM

## 2024-09-01 DIAGNOSIS — S00.451A EMBEDDED EARRING OF RIGHT EAR, INITIAL ENCOUNTER: Primary | ICD-10-CM

## 2024-09-01 DIAGNOSIS — L08.9 INFECTED EMBEDDED EARRING: ICD-10-CM

## 2024-09-01 PROCEDURE — 99283 EMERGENCY DEPT VISIT LOW MDM: CPT

## 2024-09-01 RX ORDER — CEPHALEXIN 500 MG/1
500 CAPSULE ORAL 2 TIMES DAILY
Qty: 14 CAPSULE | Refills: 0 | Status: SHIPPED | OUTPATIENT
Start: 2024-09-01 | End: 2024-09-08

## 2024-09-01 ASSESSMENT — PAIN - FUNCTIONAL ASSESSMENT: PAIN_FUNCTIONAL_ASSESSMENT: 0-10

## 2024-09-01 ASSESSMENT — COLUMBIA-SUICIDE SEVERITY RATING SCALE - C-SSRS
6. HAVE YOU EVER DONE ANYTHING, STARTED TO DO ANYTHING, OR PREPARED TO DO ANYTHING TO END YOUR LIFE?: NO
1. IN THE PAST MONTH, HAVE YOU WISHED YOU WERE DEAD OR WISHED YOU COULD GO TO SLEEP AND NOT WAKE UP?: NO
2. HAVE YOU ACTUALLY HAD ANY THOUGHTS OF KILLING YOURSELF?: NO

## 2024-09-01 ASSESSMENT — PAIN SCALES - GENERAL: PAINLEVEL_OUTOF10: 1

## 2024-09-01 NOTE — ED PROVIDER NOTES
HPI   Chief Complaint   Patient presents with    Foreign Body in Ear     Pt has an earring stud that has gotten trapped in her ear and she cannot get it out x2 days.        Patient is a 34-year-old female presenting with foreign body in right ear.  States 2 weeks ago, she had a right tragus piercing and over the last several days, she has noted pain, swelling.  States that she has been unable to remove it for last 2 days and states today she thought that the urine was actually becoming embedded.  States there is some pain but no hearing loss.  Patient denies fevers, chills, cough, sore throat, runny nose, chest pain, shortness of breath, abdominal pain, nausea, vomiting, diarrhea or urinary complaints.              Patient History   Past Medical History:   Diagnosis Date    Disorder of lipoprotein metabolism, unspecified     Borderline high cholesterol    Morbid (severe) obesity due to excess calories (Multi) 03/11/2021    Morbid obesity with BMI of 40.0-44.9, adult     Past Surgical History:   Procedure Laterality Date    OTHER SURGICAL HISTORY  05/06/2020    [place] tooth extraction    OTHER SURGICAL HISTORY  08/26/2021    Bariatric surgery     Family History   Problem Relation Name Age of Onset    Diabetes Mother      Hyperlipidemia Father      Sleep apnea Father      Asthma Brother      Ovarian cancer Paternal Grandmother  75    Colon cancer Paternal Grandfather       Social History     Tobacco Use    Smoking status: Never     Passive exposure: Never    Smokeless tobacco: Never   Vaping Use    Vaping status: Never Used   Substance Use Topics    Alcohol use: Yes     Comment: occasional    Drug use: Never       Physical Exam   ED Triage Vitals [09/01/24 1704]   Temperature Heart Rate Respirations BP   36.6 °C (97.9 °F) 61 16 122/79      Pulse Ox Temp src Heart Rate Source Patient Position   99 % -- -- --      BP Location FiO2 (%)     -- --       Physical Exam  Vitals and nursing note reviewed.   Constitutional:        Appearance: She is well-developed.      Comments: Awake, sitting in examination bed   HENT:      Head: Normocephalic and atraumatic.      Ears:      Comments: Minimal swelling and mild puncture hole in the right tragus     Nose: Nose normal.      Mouth/Throat:      Mouth: Mucous membranes are moist.      Pharynx: Oropharynx is clear.   Eyes:      Extraocular Movements: Extraocular movements intact.      Conjunctiva/sclera: Conjunctivae normal.      Pupils: Pupils are equal, round, and reactive to light.   Cardiovascular:      Rate and Rhythm: Normal rate and regular rhythm.      Pulses: Normal pulses.      Heart sounds: Normal heart sounds. No murmur heard.  Pulmonary:      Effort: Pulmonary effort is normal. No respiratory distress.      Breath sounds: Normal breath sounds.   Abdominal:      General: Abdomen is flat.      Palpations: Abdomen is soft.      Tenderness: There is no abdominal tenderness.   Musculoskeletal:         General: No swelling.      Cervical back: Normal range of motion and neck supple.   Skin:     General: Skin is warm and dry.      Capillary Refill: Capillary refill takes less than 2 seconds.   Neurological:      General: No focal deficit present.      Mental Status: She is alert and oriented to person, place, and time.   Psychiatric:         Mood and Affect: Mood normal.         Behavior: Behavior normal.           ED Course & MDM   Diagnoses as of 09/01/24 1818   Embedded earring of right ear, initial encounter   Infected embedded earring                 No data recorded     Orient Coma Scale Score: 15 (09/01/24 1710 : Sunshine Sanchez RN)                           Medical Decision Making  Patient is a 34-year-old female presenting with foreign body in right ear.  Prior to my arrival, nursing staff was able to extract the foreign body.  On physical examination, there is no retained foreign body.  Conditions considered include but are not limited to: Foreign body, cellulitis.    I believe  this patient is at low risk for complication, and a disposition of discharge is acceptable.  Return to the Emergency Department if new or worsening symptoms including headache, fever, chills, chest pain, shortness of breath, syncope, near syncope, abdominal pain, nausea, vomiting,  diarrhea, or worsening pain.  Discussed with patient that a prescription for antibiotics would be written to cover for potential cellulitis.  Also discussed using over-the-counter medications for pain control and to follow-up primary care in the next several days.  Patient is agreeable to this and to take antibiotics until completion.    Portions of this note made with Dragon software, please be mindful of potential grammatical errors.        Procedure  Procedures     Salvador Barone PA-C  09/01/24 4420

## 2024-09-01 NOTE — Clinical Note
Mirela Fair was seen and treated in our emergency department on 9/1/2024.  She may return to work on 09/03/2024.       If you have any questions or concerns, please don't hesitate to call.      Salvador Barone PA-C

## 2024-09-13 ENCOUNTER — LAB (OUTPATIENT)
Dept: LAB | Facility: LAB | Age: 34
End: 2024-09-13
Payer: COMMERCIAL

## 2024-09-16 ENCOUNTER — APPOINTMENT (OUTPATIENT)
Dept: ENDOCRINOLOGY | Facility: CLINIC | Age: 34
End: 2024-09-16
Payer: COMMERCIAL

## 2024-09-17 ENCOUNTER — LAB (OUTPATIENT)
Dept: LAB | Facility: LAB | Age: 34
End: 2024-09-17
Payer: COMMERCIAL

## 2024-09-17 DIAGNOSIS — N92.6 IRREGULAR PERIODS/MENSTRUAL CYCLES: ICD-10-CM

## 2024-09-17 PROCEDURE — 36415 COLL VENOUS BLD VENIPUNCTURE: CPT

## 2024-09-17 PROCEDURE — 84144 ASSAY OF PROGESTERONE: CPT

## 2024-09-20 LAB — PROGEST SERPL-MCNC: 8.4 NG/ML

## 2024-09-23 ENCOUNTER — TELEPHONE (OUTPATIENT)
Dept: ENDOCRINOLOGY | Facility: CLINIC | Age: 34
End: 2024-09-23
Payer: COMMERCIAL

## 2024-09-23 DIAGNOSIS — N97.9 FEMALE INFERTILITY: ICD-10-CM

## 2024-09-23 RX ORDER — LETROZOLE 2.5 MG/1
2.5 TABLET, FILM COATED ORAL DAILY
Qty: 5 TABLET | Refills: 0 | Status: SHIPPED | OUTPATIENT
Start: 2024-09-23 | End: 2024-12-22

## 2024-09-23 NOTE — TELEPHONE ENCOUNTER
Called the patient she verified her name and date of birth Period started 9-23-24 Home pregnancy test is negative Needs letrozole sent in to pharmacy I advised she does not need progesterone checked again and since she ovulated last cycle we will continue with same dose of letrozole patient verbalized an understanding    ARGENTINA KATE 09/23/24 12:43 PM      Boarding pass reviewed with: patient  LMP 8/27/24  Protocol: Letrozole 2.5mg/TI x 3. Plan on cd 21 first cycle to confirm ovualation.  9/16- huddle for cd 21 progesterone  Sperm: partner  Testing up to date. yes  Procedure order placed. yes     Boarding pass approved for 3 cycles/until 5/25

## 2024-10-28 ENCOUNTER — TELEPHONE (OUTPATIENT)
Dept: ENDOCRINOLOGY | Facility: CLINIC | Age: 34
End: 2024-10-28

## 2024-10-28 DIAGNOSIS — N97.9 FEMALE INFERTILITY: ICD-10-CM

## 2024-10-28 RX ORDER — LETROZOLE 2.5 MG/1
2.5 TABLET, FILM COATED ORAL DAILY
Qty: 5 TABLET | Refills: 0 | Status: SHIPPED | OUTPATIENT
Start: 2024-10-28 | End: 2025-01-26

## 2024-10-28 NOTE — TELEPHONE ENCOUNTER
Called the patient she verified her name and date of birth Needs letrozole Period started 10-26-24 Home pregnancy test is neg I advised to make a follow up This is her third cycle Patient verbalized an understanding    ARGENTINA KATE 10/28/24 11:48 AM

## 2024-10-28 NOTE — TELEPHONE ENCOUNTER
Caller:   Reason for call: Medication request  Medication requested:  Letrozole    Pharmacy: [facility] on [address] [place]

## 2025-02-25 ENCOUNTER — TELEMEDICINE (OUTPATIENT)
Dept: ENDOCRINOLOGY | Facility: CLINIC | Age: 35
End: 2025-02-25
Payer: COMMERCIAL

## 2025-02-25 DIAGNOSIS — Z31.41 FERTILITY TESTING: Primary | ICD-10-CM

## 2025-02-25 DIAGNOSIS — N97.9 FEMALE INFERTILITY: ICD-10-CM

## 2025-02-25 DIAGNOSIS — Z01.812 ENCOUNTER FOR PREPROCEDURAL LABORATORY EXAMINATION: ICD-10-CM

## 2025-02-25 PROCEDURE — 1036F TOBACCO NON-USER: CPT | Performed by: NURSE PRACTITIONER

## 2025-02-25 PROCEDURE — 99214 OFFICE O/P EST MOD 30 MIN: CPT | Performed by: NURSE PRACTITIONER

## 2025-02-25 NOTE — PROGRESS NOTES
Virtual or Telephone Consent: An interactive audio and video telecommunication system which permits real time communications between the patient (at the originating site) and provider (at the distant site) was utilized to provide this telehealth service  MD reviewed, Authorization status not noted.    Follow Up Visit HPI    Patient is a 35 y.o.  female with  oligomenorrhea  presenting today for follow up visit. Has completed 3 rounds of letrozole with TI. Confirmed ovulation.    Testing to date:   Result Date Done   TSH: 2.00 (Ref range: 0.44 - 3.98 mIU/L) 2024   AMH: 2.600 (Ref range: 0.176 - 11.705 ng/mL) 2024   PRL: 14.0 (Ref range: 3.0 - 20.0 ug/L) 2024   Testosterone: No results found for requested labs within last 365 days. No results found for requested labs within last 365 days.   DHEAS: 203 (Ref range: 12 - 379 ug/dL) 3/22/2024   Other:     Hysterosalpingogram: not done  Saline Infused Sonography: US SONOHYSTEROGRAM (2024): normal  GYN Pelvic Ultrasound: na    Partner SA: No Assessment    Treatment to date:   Fertility Cycles       Cycle Name Treatment Start Date Type Outcome    letrozole 2.5 mg TIC # 3 10/26/2024 [place]ed [place] Active    Cycle Created on 2024 Timed [place] No Pregnancy    Cycle #1 2024 Timed [place] No Pregnancy            Past Medical History:   Diagnosis Date    Disorder of lipoprotein metabolism, unspecified     Borderline high cholesterol    Morbid (severe) obesity due to excess calories (Multi) 2021    Morbid obesity with BMI of 40.0-44.9, adult     Past Surgical History:   Procedure Laterality Date    OTHER SURGICAL HISTORY  2020    [place] tooth extraction    OTHER SURGICAL HISTORY  2021    Bariatric surgery     Current Outpatient Medications on File Prior to Visit   Medication Sig Dispense Refill    biotin 5,000 mcg tablet,disintegrating Take 1 tablet by mouth early in the morning..      calcium carbonate  (Oscal) 500 mg calcium (1,250 mg) tablet Take by mouth.      cyanocobalamin (Vitamin B-12) 1,000 mcg tablet Take by mouth.      mv-calcium-min-iron fm-FA-vitK (Multi For Her) 18 mg iron-600 mcg-80 mcg tablet Take by mouth.       No current facility-administered medications on file prior to visit.       BMI:   BMI Readings from Last 1 Encounters:   24 27.47 kg/m²     VITALS:  There were no vitals taken for this visit.  LMP: No LMP recorded.    ASSESSMENT   35 y.o.  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    COUNSELING  We discussed that Letrozole is used for ovulation induction and that recent studies have found letrozole is superior to Clomid for ovulation induction in patients with PCOS. We discussed common side effects of Letrozole including headaches, vision changes, hot flashes, mood changes, and breast tenderness.  Letrozole is NOT FDA approved for the treatment of infertility and was initially associated with (in some studies) a higher risk of congenital anomalies, although this was not found in a more recent large study of patients taking Letrozole for unexplained infertility. Patients must take a pregnancy test prior to starting Letrozole each month. We discussed that there is an increased risk of multiple gestation with Letrozole approximately 10% for twins and less than 1% for triplets.  Counseled regarding option of selective reduction for higher order multiples.    Letrozole: 1 tablet(s) a day, cycle days 3-7   Note: 1st day of full flow is cycle day 1      Have sexual intercourse approximately every other day for 1 week beginning cycle day 11  You don't need temperature charts or LH predictor kits because normal cycle lengths indicate ovulatory cycles.  If you are planning inseminations, you will use LH predictor kits for timing    If normal 28 - 32 day cycle, repeat above for a total  of 3 cycles    Call office if:   pregnant  cycles longer than 35 days   not pregnant after 3 regular cycles     Side effects of Letrozole may include:  acne  headache   hot flushes  leg cramps   nausea     If side effects are severe, alternative medications may be available. Letrozole has a 8-10% chance of twins and less than 1 % chance of triplets.     Progesterone:  May be given to bring on a period if you have not had a period after 40 days and a home pregnancy test is negative. Continue to take Progesterone if you have light bleeding. Expect to bleed within 2 weeks of finishing Progesterone    Letrozole is used for ovulation inductions. Letrozole is not a FDA approved medication for infertility treatment and may be associated with an increased for of congenital defects, although this was not found in a recent large study of patient taking letrozole for unexplained infertility. Letrozole is teratogenic therefore a urine pregnancy test should be taken prior to taking the medication.    Reviewed IUI process with OPK and monitoring. She does get positive OPK's.    Routine Testing  Fertility Center  STDs Within 1 year   Genetic carrier Waiver/Completed   T&S Within 1 year   AMH Within 1 year   TSH Within 1 year   Rubella/Varicella Within 5 years     BMI Testing [facility]  CBC Within 1 year   CMP Within 1 year   HgbA1c Within 1 year   Mag, Phos, Vit D <18 Within 1 year   MFM > 40  REQ   Wt loss consult > 40 OPT     PLAN  Orders Placed This Encounter   Procedures    Hysterosalpingogram (HSG)    FL hysterosalpingogram    POCT pregnancy, urine manually resulted       FOLLOW UP   Consults:  na  Simon BOOKER  Take prenatal vitamins, vitamin D 2000 IUs daily  Discussed that treatment cannot proceed until checklist items are complete.   Additional testing for BMI < 18 or > 40: No.  Patient to schedule follow up visit after testing. Ok to be a VPBP visit. Advised patient to arrange this now with the front  desk.  Chart to primary nurse for care coordination and patient check list/education.    MD Completion:  Ectopic Risk: No  Medically Complex: No  Outstanding boarding pass items: SA and STD's for partner, HSG for patient    Fertility Plan Update: letrozole2.5/opk or monitoring/IUI x 3    Intimate Exam Performed: No, an intimate exam was not performed at this encounter.     Eri Landaverde  02/25/2025  3:47 PM

## 2025-03-10 ENCOUNTER — HOSPITAL ENCOUNTER (OUTPATIENT)
Dept: RADIOLOGY | Facility: HOSPITAL | Age: 35
Discharge: HOME | End: 2025-03-10
Payer: COMMERCIAL

## 2025-03-10 ENCOUNTER — ANCILLARY PROCEDURE (OUTPATIENT)
Dept: ENDOCRINOLOGY | Facility: CLINIC | Age: 35
End: 2025-03-10
Payer: COMMERCIAL

## 2025-03-10 DIAGNOSIS — R93.89 ABNORMAL RADIOGRAPHIC EXAMINATION: Primary | ICD-10-CM

## 2025-03-10 DIAGNOSIS — Z31.41 FERTILITY TESTING: ICD-10-CM

## 2025-03-10 DIAGNOSIS — Z01.812 ENCOUNTER FOR PREPROCEDURAL LABORATORY EXAMINATION: ICD-10-CM

## 2025-03-10 LAB — PREGNANCY TEST URINE, POC: NEGATIVE

## 2025-03-10 PROCEDURE — 58340 CATHETER FOR HYSTEROGRAPHY: CPT | Performed by: NURSE PRACTITIONER

## 2025-03-10 PROCEDURE — 74740 X-RAY FEMALE GENITAL TRACT: CPT

## 2025-03-10 PROCEDURE — 2550000001 HC RX 255 CONTRASTS: Performed by: NURSE PRACTITIONER

## 2025-03-10 RX ADMIN — IOHEXOL 20 ML: 240 INJECTION, SOLUTION INTRATHECAL; INTRAVASCULAR; INTRAVENOUS; ORAL at 07:39

## 2025-03-10 NOTE — PROGRESS NOTES
Reviewed possible fibroid noted at fundus. Reviewed previous scans and polyp was noted not fibroid. Will get a pelvic ult next cycle to reassess. Plan to do cd 2, 3 or 4. Ok to do letrozole/IUI same cycle.    Plan on calling if no true menses by cd 30 so they can get provera to start letrozole again. They did not take letrozole this cycle.    Eri Landaverde 03/10/25 7:47 AM

## 2025-03-10 NOTE — PROCEDURES
Hysterosalpingogram (HSG)    Date/Time: 3/10/2025 7:41 AM    Performed by: ALFONSO Peter  Authorized by: ALFONSO Peter    Consent:     Consent obtained:  Verbal and written    Consent given by:  Patient    Risks, benefits, and alternatives were discussed: yes      Risks discussed:  Bleeding, infection and pain    Alternatives discussed:  No treatment  Universal protocol:     Procedure explained and questions answered to patient or proxy's satisfaction: yes      Relevant documents present and verified: yes      Test results available: yes      Imaging studies available: yes      Required blood products, implants, devices, and special equipment available: yes      Site/side marked: no      Immediately prior to procedure, a time out was called: yes      Patient identity confirmed:  Verbally with patient, arm band and hospital-assigned identification number  Indications:     Indications:  Fertility testing  Pre-procedure details:     Skin preparation:  Povidone-iodine  Sedation:     Sedation type:  None  Anesthesia:     Anesthesia method:  None  Procedure specific details:      Done by this MARIA DE JESUS      Hysterosalpingogram (HSG) risks, benefits, alternatives, and personnel discussed with patient who agreed to proceed.    Procedural time out        Done in room where procedure done: Yes        Done just before starting procedure: Yes                                                 All members of procedural team involved in time-out: Yes                  Active communication used: Yes                                                         All team members agreed on procedure: Yes                                        Patient correctly identified by two identifiers: Yes                                  Correct side and site identified: Yes                                                     All needed special equipment/instruments available: Yes               Prior to the start of the procedure a time out  was taken and the following were verified: The identity of the patient using two patient identifiers.   Urine pregnancy test was performed and was negative.   Risks, benefits, and alternatives of the procedure were explained to the patient.  Informed consent was obtained.     The patient was placed in the dorsal lithotomy position and a sterile speculum was placed in the vagina. The cervix was sterilized with Betadine x 3. The anterior lip of the cervix was grasped with a single-tooth tenaculum. The (balloon/acorn) cannula was then placed in the cervix and secured to the tenaculum. The patient was positioned and images were taken with fluoroscopy as dye was inserted through the cannula. All instruments were then removed. The patient tolerated the procedure well and was discharged home the same day without complications.    Uterus:  normal contour without filling defects. Indention at the fundus due to fibroid sitting on top of the uterus, but does not appear to be within the cavity.  Tubes:  bilateral patency with free spill of dye, normal tubal architecture noted, and no loculations present.  Based on these findings, my recommendation is: No further follow up required.    Eri Landaverde 03/10/25 7:42 AM        The patient was counseled regarding the above findings and images were reviewed with patient at the time of the exam.

## 2025-03-18 ENCOUNTER — TELEPHONE (OUTPATIENT)
Dept: ENDOCRINOLOGY | Facility: CLINIC | Age: 35
End: 2025-03-18
Payer: COMMERCIAL

## 2025-03-18 NOTE — TELEPHONE ENCOUNTER
Returned patient's call. Patient has HSG last Monday and was told possible fibroids and would need a pelvis US. She did not schedule this today, so she will call back tomorrow to schedule.   Partner did SA on 3/7. Unable to review results with patient as they will need to sign Authorization - sent through [employer] [facility]. Per Eri Landaverde, do not recommend IUI at this time, and if they would like to proceed with letrozole TIC for one more month, can plan to do that. BP sent to Eri rojas. Patient will discuss concerns with partner tonight and call back tomorrow to decide if she wants letrozole this month or not. Patient started her period today.     No further questions at this time.     Lluvia Strickland 03/18/25 4:23 PM

## 2025-03-18 NOTE — TELEPHONE ENCOUNTER
Caller:   Reason for call: questions regarding next steps  Notes:  pt stated that she needs a call back to get a VBPV to start her IUI process

## 2025-05-01 ENCOUNTER — OFFICE VISIT (OUTPATIENT)
Dept: URGENT CARE | Age: 35
End: 2025-05-01
Payer: COMMERCIAL

## 2025-05-01 VITALS
WEIGHT: 170 LBS | TEMPERATURE: 97.9 F | OXYGEN SATURATION: 96 % | BODY MASS INDEX: 29.18 KG/M2 | DIASTOLIC BLOOD PRESSURE: 83 MMHG | RESPIRATION RATE: 18 BRPM | HEART RATE: 68 BPM | SYSTOLIC BLOOD PRESSURE: 122 MMHG

## 2025-05-01 DIAGNOSIS — R19.7 DIARRHEA, UNSPECIFIED TYPE: Primary | ICD-10-CM

## 2025-05-01 ASSESSMENT — ENCOUNTER SYMPTOMS
HEMATOCHEZIA: 0
BELCHING: 0
ABDOMINAL PAIN: 1
CONSTIPATION: 0
HEADACHES: 1
ARTHRALGIAS: 0
FREQUENCY: 0
WEIGHT LOSS: 0
VOMITING: 0
FEVER: 0
NAUSEA: 0
DYSURIA: 0
DIARRHEA: 1
MYALGIAS: 0
FLATUS: 0
HEMATURIA: 0
ANOREXIA: 0

## 2025-05-01 NOTE — PROGRESS NOTES
Subjective   Patient ID: Mirela Fair is a 35 y.o. female. They present today with a chief complaint of Diarrhea (Sharp abdominal pain and headache when she eats & drinks; 1 day).    History of Present Illness    History provided by:  Patient  Diarrhea  Quality:  Semi-solid  Severity:  Moderate  Onset quality:  Gradual  Number of episodes:  Several  Duration:  1 day  Timing:  Sporadic  Progression:  Unchanged  Relieved by:  Nothing  Worsened by:  Nothing  Ineffective treatments:  None tried  Associated symptoms: abdominal pain and headaches    Associated symptoms: no arthralgias, no fever, no myalgias and no vomiting    Abdominal Pain  This is a new problem. The current episode started yesterday. The onset quality is sudden. The problem occurs 2 to 4 times per day. The most recent episode lasted 1 hours. The problem has been unchanged. The pain is located in the LLQ, RLQ and epigastric region. The pain is at a severity of 6/10. The quality of the pain is cramping. The abdominal pain does not radiate. Associated symptoms include diarrhea and headaches. Pertinent negatives include no anorexia, arthralgias, belching, constipation, dysuria, fever, flatus, frequency, hematochezia, hematuria, melena, myalgias, nausea, vomiting or weight loss. The pain is aggravated by eating. The pain is relieved by Bowel movements.       Past Medical History  Allergies as of 05/01/2025 - Reviewed 05/01/2025   Allergen Reaction Noted    Ibuprofen Other 10/06/2023       Prescriptions Prior to Admission[1]     Medical History[2]    Surgical History[3]     reports that she has never smoked. She has never been exposed to tobacco smoke. She has never used smokeless tobacco. She reports current alcohol use. She reports that she does not use drugs.    Review of Systems  Review of Systems   Constitutional:  Negative for fever and weight loss.   Gastrointestinal:  Positive for abdominal pain and diarrhea. Negative for anorexia, constipation,  flatus, hematochezia, melena, nausea and vomiting.   Genitourinary:  Negative for dysuria, frequency and hematuria.   Musculoskeletal:  Negative for arthralgias and myalgias.   Neurological:  Positive for headaches.   All other systems reviewed and are negative.                                 Objective    Vitals:    05/01/25 1632   BP: 122/83   BP Location: Right arm   Patient Position: Sitting   BP Cuff Size: Adult   Pulse: 68   Resp: 18   Temp: 36.6 °C (97.9 °F)   TempSrc: Oral   SpO2: 96%   Weight: 77.1 kg (170 lb)     Patient's last menstrual period was 04/18/2025 (exact date).    Physical Exam  Vitals and nursing note reviewed.   Abdominal:      General: Abdomen is flat. Bowel sounds are normal.      Palpations: Abdomen is soft.      Tenderness: There is no abdominal tenderness.         Procedures    Point of Care Test & Imaging Results from this visit  No results found for this visit on 05/01/25.   Imaging  No results found.    Cardiology, Vascular, and Other Imaging  No other imaging results found for the past 2 days      Diagnostic study results (if any) were reviewed by Crystal L Severino, APRN-CNP.    Assessment/Plan   Allergies, medications, history, and pertinent labs/EKGs/Imaging reviewed by Crystal L Severino, APRN-CNP.     Medical Decision Making  35-year-old female presents with chief complaint of abdominal cramping, headache and diarrhea x 1 day.  Patient states she recently visited [place], came home 3 days ago.  States she did start with some abdominal cramping before she left [place] and then it continued when she arrived home.  She states her stools are semisolid and today she had 3-4 episodes.  She denies taking any over-the-counter medications.  She states that she does not believe is anything she ate or drink because her  is not having any of the symptoms and they ate the same foods.  She denies any fevers or chills, urinary symptoms or blood in her stool.  Abdominal assessment is as  above.  Patient is discharged home and encouraged to try Imodium over-the-counter, if symptoms persist or worsen she is to follow-up with her PCP or go to the ED for further eval.  At time of discharge patient was clinically well-appearing and HDS for outpatient management. The patient and/or family was educated regarding diagnosis, supportive care, OTC and Rx medications. The patient and/or family was given the opportunity to ask questions prior to discharge.  They verbalized understanding of my discussion of the plans for treatment, expected course, indications to return to  or seek further evaluation in ED, and the need for timely follow up as directed.   They were provided with a work/school excuse if requested.      Orders and Diagnoses  Diagnoses and all orders for this visit:  Diarrhea, unspecified type      Medical Admin Record      Patient disposition: Home    Electronically signed by Crystal L Severino, APRN-CNP  4:37 PM           [1] (Not in a hospital admission)  [2]   Past Medical History:  Diagnosis Date    Disorder of lipoprotein metabolism, unspecified     Borderline high cholesterol    Morbid (severe) obesity due to excess calories (Multi) 03/11/2021    Morbid obesity with BMI of 40.0-44.9, adult   [3]   Past Surgical History:  Procedure Laterality Date    OTHER SURGICAL HISTORY  05/06/2020    [place] tooth extraction    OTHER SURGICAL HISTORY  08/26/2021    Bariatric surgery

## 2025-05-15 ENCOUNTER — ANCILLARY PROCEDURE (OUTPATIENT)
Dept: ENDOCRINOLOGY | Facility: CLINIC | Age: 35
End: 2025-05-15
Payer: COMMERCIAL

## 2025-05-15 DIAGNOSIS — R93.89 ABNORMAL RADIOGRAPHIC EXAMINATION: ICD-10-CM

## 2025-05-15 PROCEDURE — 76830 TRANSVAGINAL US NON-OB: CPT

## 2025-05-15 PROCEDURE — 76830 TRANSVAGINAL US NON-OB: CPT | Performed by: OBSTETRICS & GYNECOLOGY

## 2025-05-22 ENCOUNTER — TELEPHONE (OUTPATIENT)
Dept: ENDOCRINOLOGY | Facility: CLINIC | Age: 35
End: 2025-05-22
Payer: COMMERCIAL

## 2025-05-22 DIAGNOSIS — Z01.83 ENCOUNTER FOR RH BLOOD TYPING: Primary | ICD-10-CM

## 2025-05-22 DIAGNOSIS — Z11.3 SCREENING FOR STDS (SEXUALLY TRANSMITTED DISEASES): ICD-10-CM

## 2025-05-22 NOTE — TELEPHONE ENCOUNTER
Returned call to patient regarding next steps. Patient advised she would need to get yearly labs updated before receiving any fertility treatment. Patient is expecting her cycle to start in the next two weeks. Patient also asked about  testing but reminded that authorization to share with partner forms will need to be completed to share information. Orders pended to BASILIA Barth for yearly testing. Patient will call the office with the start of her next cycle. Patient verbalizes understanding and has no further questions or concerns.       05/22/25 at 10:41 AM - Aisha Tucker LPN

## 2025-05-28 ENCOUNTER — LAB (OUTPATIENT)
Dept: LAB | Facility: HOSPITAL | Age: 35
End: 2025-05-28
Payer: COMMERCIAL

## 2025-05-28 DIAGNOSIS — Z01.83 ENCOUNTER FOR BLOOD TYPING: Primary | ICD-10-CM

## 2025-05-28 LAB
ABO GROUP (TYPE) IN BLOOD: NORMAL
ANTIBODY SCREEN: NORMAL
RH FACTOR (ANTIGEN D): NORMAL

## 2025-05-28 PROCEDURE — 86900 BLOOD TYPING SEROLOGIC ABO: CPT

## 2025-05-28 PROCEDURE — 86850 RBC ANTIBODY SCREEN: CPT

## 2025-05-28 PROCEDURE — 86901 BLOOD TYPING SEROLOGIC RH(D): CPT

## 2025-05-29 LAB
C TRACH RRNA SPEC QL NAA+PROBE: NOT DETECTED
HBV SURFACE AG SERPL QL IA: NORMAL
HCV AB SERPL QL IA: NORMAL
HIV 1+2 AB+HIV1 P24 AG SERPL QL IA: NORMAL
N GONORRHOEA RRNA SPEC QL NAA+PROBE: NOT DETECTED
QUEST GC CT AMPLIFIED (ALWAYS MESSAGE): NORMAL
T PALLIDUM AB SER QL IA: NORMAL

## 2025-06-02 LAB
C TRACH RRNA SPEC QL NAA+PROBE: NOT DETECTED
HBV SURFACE AG SERPL QL IA: NORMAL
HCV AB SERPL QL IA: NORMAL
HIV 1+2 AB+HIV1 P24 AG SERPL QL IA: NORMAL
N GONORRHOEA RRNA SPEC QL NAA+PROBE: NOT DETECTED
QUEST GC CT AMPLIFIED (ALWAYS MESSAGE): NORMAL
T PALLIDUM AB SER QL IA: NEGATIVE